# Patient Record
Sex: FEMALE | Race: WHITE | NOT HISPANIC OR LATINO
[De-identification: names, ages, dates, MRNs, and addresses within clinical notes are randomized per-mention and may not be internally consistent; named-entity substitution may affect disease eponyms.]

---

## 2017-01-31 ENCOUNTER — MEDICATION RENEWAL (OUTPATIENT)
Age: 67
End: 2017-01-31

## 2017-03-23 ENCOUNTER — APPOINTMENT (OUTPATIENT)
Dept: CARDIOLOGY | Facility: CLINIC | Age: 67
End: 2017-03-23

## 2017-03-28 ENCOUNTER — OUTPATIENT (OUTPATIENT)
Dept: OUTPATIENT SERVICES | Facility: HOSPITAL | Age: 67
LOS: 1 days | Discharge: HOME | End: 2017-03-28

## 2017-05-31 ENCOUNTER — APPOINTMENT (OUTPATIENT)
Dept: CARDIOLOGY | Facility: CLINIC | Age: 67
End: 2017-05-31

## 2017-05-31 VITALS — BODY MASS INDEX: 34.96 KG/M2 | WEIGHT: 190 LBS | HEIGHT: 62 IN

## 2017-05-31 VITALS — DIASTOLIC BLOOD PRESSURE: 80 MMHG | HEART RATE: 75 BPM | SYSTOLIC BLOOD PRESSURE: 130 MMHG

## 2017-06-27 DIAGNOSIS — Z12.31 ENCOUNTER FOR SCREENING MAMMOGRAM FOR MALIGNANT NEOPLASM OF BREAST: ICD-10-CM

## 2017-10-20 ENCOUNTER — APPOINTMENT (OUTPATIENT)
Dept: CARDIOLOGY | Facility: CLINIC | Age: 67
End: 2017-10-20

## 2017-10-20 VITALS — SYSTOLIC BLOOD PRESSURE: 120 MMHG | DIASTOLIC BLOOD PRESSURE: 80 MMHG | HEART RATE: 76 BPM

## 2017-10-20 VITALS — BODY MASS INDEX: 32.02 KG/M2 | WEIGHT: 174 LBS | HEIGHT: 62 IN

## 2018-01-12 ENCOUNTER — APPOINTMENT (OUTPATIENT)
Dept: CARDIOLOGY | Facility: CLINIC | Age: 68
End: 2018-01-12

## 2018-03-01 ENCOUNTER — APPOINTMENT (OUTPATIENT)
Dept: CARDIOLOGY | Facility: CLINIC | Age: 68
End: 2018-03-01

## 2018-03-01 VITALS — WEIGHT: 175 LBS | BODY MASS INDEX: 32.2 KG/M2 | HEIGHT: 62 IN

## 2018-03-01 VITALS — SYSTOLIC BLOOD PRESSURE: 130 MMHG | DIASTOLIC BLOOD PRESSURE: 80 MMHG | HEART RATE: 79 BPM

## 2018-03-01 VITALS — DIASTOLIC BLOOD PRESSURE: 82 MMHG | HEART RATE: 79 BPM | SYSTOLIC BLOOD PRESSURE: 140 MMHG

## 2018-04-03 ENCOUNTER — APPOINTMENT (OUTPATIENT)
Dept: VASCULAR SURGERY | Facility: CLINIC | Age: 68
End: 2018-04-03
Payer: MEDICARE

## 2018-04-03 VITALS
SYSTOLIC BLOOD PRESSURE: 150 MMHG | BODY MASS INDEX: 35.28 KG/M2 | WEIGHT: 175 LBS | DIASTOLIC BLOOD PRESSURE: 80 MMHG | HEIGHT: 59 IN

## 2018-04-03 PROCEDURE — 93880 EXTRACRANIAL BILAT STUDY: CPT

## 2018-04-03 PROCEDURE — 99203 OFFICE O/P NEW LOW 30 MIN: CPT

## 2018-04-10 ENCOUNTER — OUTPATIENT (OUTPATIENT)
Dept: OUTPATIENT SERVICES | Facility: HOSPITAL | Age: 68
LOS: 1 days | Discharge: HOME | End: 2018-04-10

## 2018-04-10 DIAGNOSIS — Z12.31 ENCOUNTER FOR SCREENING MAMMOGRAM FOR MALIGNANT NEOPLASM OF BREAST: ICD-10-CM

## 2018-07-19 ENCOUNTER — APPOINTMENT (OUTPATIENT)
Dept: CARDIOLOGY | Facility: CLINIC | Age: 68
End: 2018-07-19

## 2018-07-19 VITALS
HEIGHT: 59 IN | SYSTOLIC BLOOD PRESSURE: 146 MMHG | HEART RATE: 75 BPM | BODY MASS INDEX: 35.88 KG/M2 | WEIGHT: 178 LBS | DIASTOLIC BLOOD PRESSURE: 80 MMHG

## 2018-07-19 VITALS — DIASTOLIC BLOOD PRESSURE: 80 MMHG | SYSTOLIC BLOOD PRESSURE: 124 MMHG

## 2018-10-10 ENCOUNTER — RX RENEWAL (OUTPATIENT)
Age: 68
End: 2018-10-10

## 2018-12-20 ENCOUNTER — APPOINTMENT (OUTPATIENT)
Dept: CARDIOLOGY | Facility: CLINIC | Age: 68
End: 2018-12-20

## 2018-12-20 VITALS — SYSTOLIC BLOOD PRESSURE: 144 MMHG | DIASTOLIC BLOOD PRESSURE: 80 MMHG

## 2018-12-20 VITALS
HEART RATE: 69 BPM | SYSTOLIC BLOOD PRESSURE: 156 MMHG | WEIGHT: 183 LBS | HEIGHT: 59 IN | BODY MASS INDEX: 36.89 KG/M2 | DIASTOLIC BLOOD PRESSURE: 86 MMHG

## 2018-12-20 NOTE — PHYSICAL EXAM
[General Appearance - Well Developed] : well developed [Normal Appearance] : normal appearance [Well Groomed] : well groomed [General Appearance - Well Nourished] : well nourished [No Deformities] : no deformities [General Appearance - In No Acute Distress] : no acute distress [Normal Conjunctiva] : the conjunctiva exhibited no abnormalities [Eyelids - No Xanthelasma] : the eyelids demonstrated no xanthelasmas [Normal Oral Mucosa] : normal oral mucosa [No Oral Pallor] : no oral pallor [No Oral Cyanosis] : no oral cyanosis [Respiration, Rhythm And Depth] : normal respiratory rhythm and effort [Exaggerated Use Of Accessory Muscles For Inspiration] : no accessory muscle use [Auscultation Breath Sounds / Voice Sounds] : lungs were clear to auscultation bilaterally [Abdomen Soft] : soft [Abdomen Tenderness] : non-tender [Abdomen Mass (___ Cm)] : no abdominal mass palpated [Abnormal Walk] : normal gait [Gait - Sufficient For Exercise Testing] : the gait was sufficient for exercise testing [Nail Clubbing] : no clubbing of the fingernails [Cyanosis, Localized] : no localized cyanosis [Petechial Hemorrhages (___cm)] : no petechial hemorrhages [Skin Color & Pigmentation] : normal skin color and pigmentation [] : no rash [No Venous Stasis] : no venous stasis [Skin Lesions] : no skin lesions [No Skin Ulcers] : no skin ulcer [No Xanthoma] : no  xanthoma was observed [Oriented To Time, Place, And Person] : oriented to person, place, and time [Affect] : the affect was normal [Mood] : the mood was normal [No Anxiety] : not feeling anxious [Normal Rate] : normal [Rhythm Regular] : regular [II] : a grade 2 [2+] : left 2+ [No Pitting Edema] : no pitting edema present [FreeTextEntry1] : No JVD  [Rt] : no varicose veins of the right leg

## 2018-12-20 NOTE — ASSESSMENT
[FreeTextEntry1] : The patient is seeing neurology for possible dementia. Her LDL is not at goal. . There is a question if she is taking her Atorvastatin on a regular basis. She has Mild AS . and risk factors for CAD . She has right knee pain . Advised her to see orthopedics.

## 2018-12-20 NOTE — HISTORY OF PRESENT ILLNESS
[FreeTextEntry1] : The patient has had worsening dementia. The patient has been followed by Dr Carter. . She has had worsening right knee pain . .

## 2019-01-16 ENCOUNTER — APPOINTMENT (OUTPATIENT)
Dept: CARDIOLOGY | Facility: CLINIC | Age: 69
End: 2019-01-16

## 2019-01-16 ENCOUNTER — APPOINTMENT (OUTPATIENT)
Dept: VASCULAR SURGERY | Facility: CLINIC | Age: 69
End: 2019-01-16
Payer: MEDICARE

## 2019-01-16 PROCEDURE — 99213 OFFICE O/P EST LOW 20 MIN: CPT

## 2019-01-16 PROCEDURE — 93925 LOWER EXTREMITY STUDY: CPT

## 2019-01-16 NOTE — HISTORY OF PRESENT ILLNESS
[FreeTextEntry1] : 68 years old female presents to rule out "circulation" problem in the legs. She feels her feet are cold always and gets cramps in the leg at night. Able to walk long distance with no claudication pain. No ulcers. Has some venous stasis skin discoloration in both legs but no large varicose veins. NO history of DVT

## 2019-01-16 NOTE — ASSESSMENT
[FreeTextEntry1] : 68 years old female with normal arterial duplex and no evidence of PAD. She has venous insufficiency and stasis skin changes but no large varicose veins. I advised for leg elevation and stockings. I offered her venous reflux studies in the next visit, she will f/u as needed.

## 2019-01-16 NOTE — PHYSICAL EXAM
[JVD] : no jugular venous distention  [Normal Breath Sounds] : Normal breath sounds [2+] : left 2+ [1+] : left 1+ [Ankle Swelling (On Exam)] : present [Ankle Swelling Bilaterally] : bilaterally  [Varicose Veins Of Lower Extremities] : not present [] : bilaterally [Ankle Swelling On The Right] : mild [FreeTextEntry1] : mild venous stasis changes

## 2019-01-16 NOTE — REVIEW OF SYSTEMS
[Diarrhea] : diarrhea [Fever] : no fever [Chills] : no chills [Chest Pain] : no chest pain [Shortness Of Breath] : no shortness of breath [Abdominal Pain] : no abdominal pain [Vomiting] : no vomiting [Constipation] : no constipation [Dizziness] : no dizziness

## 2019-01-16 NOTE — REASON FOR VISIT
[Consultation] : a consultation visit [FreeTextEntry1] : Mrs Olson is here today for cold feet for the past year

## 2019-01-16 NOTE — DATA REVIEWED
[FreeTextEntry1] : Arterial duplex of b/l LE: patent arteries in both legs, no evidence of significant arterial disease.

## 2019-04-03 ENCOUNTER — APPOINTMENT (OUTPATIENT)
Dept: VASCULAR SURGERY | Facility: CLINIC | Age: 69
End: 2019-04-03
Payer: MEDICARE

## 2019-04-03 VITALS
DIASTOLIC BLOOD PRESSURE: 80 MMHG | SYSTOLIC BLOOD PRESSURE: 140 MMHG | OXYGEN SATURATION: 97 % | WEIGHT: 177 LBS | BODY MASS INDEX: 35.68 KG/M2 | HEIGHT: 59 IN | HEART RATE: 65 BPM

## 2019-04-03 PROCEDURE — 99212 OFFICE O/P EST SF 10 MIN: CPT

## 2019-04-03 NOTE — ASSESSMENT
[FreeTextEntry1] : 68 years old female with normal arterial duplex and no evidence of PAD. She has venous insufficiency and stasis skin changes but no large varicose veins. I advised for leg elevation and stockings. She will f/u for venous reflux studies in 4-6 weeks.

## 2019-04-03 NOTE — REASON FOR VISIT
[Follow-Up: _____] : a [unfilled] follow-up visit [FreeTextEntry1] : Mrs Olson is here today for 3 month follow on lower extremities

## 2019-04-03 NOTE — HISTORY OF PRESENT ILLNESS
[FreeTextEntry1] : 68 years old female presents for ruling out venous insufficiency. She feels her feet are cold always and gets cramps in the leg at night. Able to walk long distance with no claudication pain. No ulcers. Has some venous stasis skin discoloration in both legs but no large varicose veins. NO history of DVT. Has normal arterial duplex last visit.

## 2019-04-03 NOTE — REVIEW OF SYSTEMS
[Fever] : no fever [Chills] : no chills [Chest Pain] : no chest pain [Shortness Of Breath] : no shortness of breath [Abdominal Pain] : no abdominal pain [Vomiting] : no vomiting [Constipation] : no constipation [Diarrhea] : diarrhea [Dizziness] : no dizziness

## 2019-04-18 ENCOUNTER — OUTPATIENT (OUTPATIENT)
Dept: OUTPATIENT SERVICES | Facility: HOSPITAL | Age: 69
LOS: 1 days | Discharge: HOME | End: 2019-04-18
Payer: MEDICARE

## 2019-04-18 DIAGNOSIS — Z12.31 ENCOUNTER FOR SCREENING MAMMOGRAM FOR MALIGNANT NEOPLASM OF BREAST: ICD-10-CM

## 2019-04-18 PROCEDURE — 77063 BREAST TOMOSYNTHESIS BI: CPT | Mod: 26

## 2019-04-18 PROCEDURE — 77067 SCR MAMMO BI INCL CAD: CPT | Mod: 26

## 2019-04-19 DIAGNOSIS — Z78.0 ASYMPTOMATIC MENOPAUSAL STATE: ICD-10-CM

## 2019-04-19 DIAGNOSIS — Z87.310 PERSONAL HISTORY OF (HEALED) OSTEOPOROSIS FRACTURE: ICD-10-CM

## 2019-04-19 DIAGNOSIS — Z13.820 ENCOUNTER FOR SCREENING FOR OSTEOPOROSIS: ICD-10-CM

## 2019-04-19 DIAGNOSIS — M89.9 DISORDER OF BONE, UNSPECIFIED: ICD-10-CM

## 2019-05-20 ENCOUNTER — APPOINTMENT (OUTPATIENT)
Dept: CARDIOLOGY | Facility: CLINIC | Age: 69
End: 2019-05-20
Payer: MEDICARE

## 2019-05-20 VITALS
DIASTOLIC BLOOD PRESSURE: 84 MMHG | BODY MASS INDEX: 36.69 KG/M2 | HEIGHT: 59 IN | SYSTOLIC BLOOD PRESSURE: 160 MMHG | WEIGHT: 182 LBS | HEART RATE: 75 BPM

## 2019-05-20 VITALS — HEIGHT: 55 IN | BODY MASS INDEX: 42.35 KG/M2 | WEIGHT: 183 LBS

## 2019-05-20 PROCEDURE — 93000 ELECTROCARDIOGRAM COMPLETE: CPT

## 2019-05-20 PROCEDURE — 99214 OFFICE O/P EST MOD 30 MIN: CPT

## 2019-05-20 NOTE — HISTORY OF PRESENT ILLNESS
[FreeTextEntry1] : The patient had neuro-cognative testing with results pending. No chest pain . No SOB

## 2019-05-20 NOTE — ASSESSMENT
[FreeTextEntry1] : The patient has had worsening dementia. She is in the process of getting neuro cognitive testing. She has not had chest pain . BP is borderline to high BP . . She has had no chest pain or shortness of breth

## 2019-05-21 ENCOUNTER — OUTPATIENT (OUTPATIENT)
Dept: OUTPATIENT SERVICES | Facility: HOSPITAL | Age: 69
LOS: 1 days | Discharge: HOME | End: 2019-05-21

## 2019-05-21 DIAGNOSIS — G31.84 MILD COGNITIVE IMPAIRMENT OF UNCERTAIN OR UNKNOWN ETIOLOGY: ICD-10-CM

## 2019-06-05 ENCOUNTER — APPOINTMENT (OUTPATIENT)
Dept: VASCULAR SURGERY | Facility: CLINIC | Age: 69
End: 2019-06-05
Payer: MEDICARE

## 2019-06-05 VITALS — BODY MASS INDEX: 36.76 KG/M2 | DIASTOLIC BLOOD PRESSURE: 80 MMHG | SYSTOLIC BLOOD PRESSURE: 145 MMHG | WEIGHT: 182 LBS

## 2019-06-05 PROCEDURE — 99213 OFFICE O/P EST LOW 20 MIN: CPT

## 2019-06-05 PROCEDURE — 93970 EXTREMITY STUDY: CPT

## 2019-06-05 NOTE — HISTORY OF PRESENT ILLNESS
[FreeTextEntry1] : 68 years old female presents for ruling out venous insufficiency and to get venous reflux studies. She feels her feet are cold always. Able to walk long distance with no claudication pain. No ulcers. Has some venous stasis skin discoloration in both legs but no large varicose veins. NO history of DVT. Has normal arterial duplex last visit.

## 2019-06-05 NOTE — ASSESSMENT
[FreeTextEntry1] : 68 years old female with normal arterial duplex and no evidence of PAD. She has nmild stasis skin changes but no large varicose veins and her venous duplex today showed no DVT or reflux in both legs. I advised for leg elevation and stockings as needed. She will f/u on as needed basis.

## 2019-06-05 NOTE — REVIEW OF SYSTEMS
[Fever] : no fever [Chills] : no chills [Chest Pain] : no chest pain [Abdominal Pain] : no abdominal pain [Vomiting] : no vomiting [Shortness Of Breath] : no shortness of breath [Constipation] : no constipation [Diarrhea] : diarrhea [Dizziness] : no dizziness

## 2019-06-05 NOTE — REASON FOR VISIT
[FreeTextEntry1] : Erin is a 68 yr f for venous insufficiency  and stasis skin changes but no large varicose veins, She is here today for venous reflux studies

## 2019-06-05 NOTE — PHYSICAL EXAM
[JVD] : no jugular venous distention  [Normal Breath Sounds] : Normal breath sounds [2+] : right 2+ [1+] : right 1+ [Ankle Swelling (On Exam)] : present [Ankle Swelling Bilaterally] : bilaterally  [] : present [Varicose Veins Of Lower Extremities] : not present [Ankle Swelling On The Right] : mild [FreeTextEntry1] : mild venous stasis changes

## 2019-06-07 ENCOUNTER — RECORD ABSTRACTING (OUTPATIENT)
Age: 69
End: 2019-06-07

## 2019-07-03 ENCOUNTER — APPOINTMENT (OUTPATIENT)
Dept: CARDIOLOGY | Facility: CLINIC | Age: 69
End: 2019-07-03
Payer: MEDICARE

## 2019-07-03 VITALS
WEIGHT: 183 LBS | BODY MASS INDEX: 36.89 KG/M2 | HEART RATE: 80 BPM | SYSTOLIC BLOOD PRESSURE: 142 MMHG | HEIGHT: 59 IN | DIASTOLIC BLOOD PRESSURE: 88 MMHG

## 2019-07-03 PROCEDURE — 99214 OFFICE O/P EST MOD 30 MIN: CPT

## 2019-07-03 PROCEDURE — 93000 ELECTROCARDIOGRAM COMPLETE: CPT

## 2019-07-03 NOTE — HISTORY OF PRESENT ILLNESS
[FreeTextEntry1] : The patient has tolerated higher does Valsartan. thought to have some memory issues on her neurocognitive testing .

## 2019-07-03 NOTE — ASSESSMENT
[FreeTextEntry1] : The patient's BP is better controlled. No chest pain or shortness of breath . Had neurocognitive testing and she was told of memory issues but results are not available to me at this time.

## 2019-07-03 NOTE — REASON FOR VISIT
[Hypertension] : hypertension [Palpitations] : palpitations [Follow-Up - Clinic] : a clinic follow-up of

## 2019-07-03 NOTE — PHYSICAL EXAM
[General Appearance - Well Developed] : well developed [No Deformities] : no deformities [Well Groomed] : well groomed [Normal Appearance] : normal appearance [General Appearance - Well Nourished] : well nourished [General Appearance - In No Acute Distress] : no acute distress [Normal Conjunctiva] : the conjunctiva exhibited no abnormalities [Eyelids - No Xanthelasma] : the eyelids demonstrated no xanthelasmas [Normal Oral Mucosa] : normal oral mucosa [No Oral Pallor] : no oral pallor [FreeTextEntry1] : No JVD  [No Oral Cyanosis] : no oral cyanosis [Exaggerated Use Of Accessory Muscles For Inspiration] : no accessory muscle use [Respiration, Rhythm And Depth] : normal respiratory rhythm and effort [Abdomen Tenderness] : non-tender [Auscultation Breath Sounds / Voice Sounds] : lungs were clear to auscultation bilaterally [Abdomen Soft] : soft [Abdomen Mass (___ Cm)] : no abdominal mass palpated [Abnormal Walk] : normal gait [Gait - Sufficient For Exercise Testing] : the gait was sufficient for exercise testing [Nail Clubbing] : no clubbing of the fingernails [Petechial Hemorrhages (___cm)] : no petechial hemorrhages [Cyanosis, Localized] : no localized cyanosis [] : no ischemic changes [Skin Color & Pigmentation] : normal skin color and pigmentation [No Skin Ulcers] : no skin ulcer [Skin Lesions] : no skin lesions [No Venous Stasis] : no venous stasis [No Xanthoma] : no  xanthoma was observed [Mood] : the mood was normal [Oriented To Time, Place, And Person] : oriented to person, place, and time [Affect] : the affect was normal [No Anxiety] : not feeling anxious [Normal Rate] : normal [II] : a grade 2 [Rhythm Regular] : regular [2+] : right 2+ [Rt] : no varicose veins of the right leg [No Pitting Edema] : no pitting edema present

## 2019-07-08 ENCOUNTER — APPOINTMENT (OUTPATIENT)
Dept: NEUROLOGY | Facility: CLINIC | Age: 69
End: 2019-07-08
Payer: MEDICARE

## 2019-07-08 VITALS
SYSTOLIC BLOOD PRESSURE: 130 MMHG | DIASTOLIC BLOOD PRESSURE: 70 MMHG | BODY MASS INDEX: 33.57 KG/M2 | HEIGHT: 62 IN | TEMPERATURE: 98.3 F | OXYGEN SATURATION: 99 % | HEART RATE: 73 BPM | WEIGHT: 182.4 LBS

## 2019-07-08 PROCEDURE — 99214 OFFICE O/P EST MOD 30 MIN: CPT

## 2019-07-08 NOTE — HISTORY OF PRESENT ILLNESS
[FreeTextEntry1] : barry is here for followup. She had done her neuropsychology test done the diagnosis was mild neurocognitive impairment, generalized anxiety disorder, recurrent major depression.\par I have seen her first floor having issues with her memory. Throughout the period that I saw her perhaps in 3 or 4 occasions it has always been my impression that her main issue with the memory was related to her mood and perhaps asleep in fact after her first she did much better and in last visit aside from sending care for her neuropsychology test I had started her on Celexa but she did not start yet.\par Clearly there is some issues related to her  and his diagnosis of posttraumatic stress disorder in that created significant degree of anxiety for her. She says her  goes to bed around 6:30 PM and that is the time when she relaxes and in fact you in that time she watches TV and she eats some junk and comfort food.\par She appeared quite emotional during couple visits in the past now she is better.\par She believes her memory issues had been started from that time that she did have her knee surgery. At that time this she does have a obstructive sleep apnea and gastroesophageal reflux and dyslipidemia and hypothyroidism and hypertension and borderline diabetes.

## 2019-07-08 NOTE — PHYSICAL EXAM
[FreeTextEntry1] : She is in a better mood she makes a meaningful interaction with normal language follows first to commands and crosses the midline well at executive behavior is normal. Her memory recall is also 3 out of 3.\par The motor and sensory and cerebellar exam are normal she is a slightly hesitant putting weight on the right side her gait is stable

## 2019-07-08 NOTE — ASSESSMENT
[FreeTextEntry1] : Place year and a history as described above. I I believe majority of her issues related to the memory comes from her mood and anxiety and to some extent in her sleep pattern we will restart the Celexa she agrees to take the medication and I will see her in 3 months he had also discussed extensively today is to take care of her anxiety abnormal.

## 2019-10-21 ENCOUNTER — APPOINTMENT (OUTPATIENT)
Dept: NEUROLOGY | Facility: CLINIC | Age: 69
End: 2019-10-21
Payer: MEDICARE

## 2019-10-21 VITALS
OXYGEN SATURATION: 99 % | HEART RATE: 79 BPM | TEMPERATURE: 97.3 F | BODY MASS INDEX: 33.49 KG/M2 | WEIGHT: 182 LBS | HEIGHT: 62 IN | DIASTOLIC BLOOD PRESSURE: 70 MMHG | SYSTOLIC BLOOD PRESSURE: 132 MMHG

## 2019-10-21 DIAGNOSIS — F39 UNSPECIFIED MOOD [AFFECTIVE] DISORDER: ICD-10-CM

## 2019-10-21 PROCEDURE — 99214 OFFICE O/P EST MOD 30 MIN: CPT

## 2019-10-21 RX ORDER — VALSARTAN 320 MG/1
320 TABLET, COATED ORAL DAILY
Qty: 90 | Refills: 3 | Status: DISCONTINUED | COMMUNITY
End: 2019-10-21

## 2019-10-21 NOTE — HISTORY OF PRESENT ILLNESS
[FreeTextEntry1] : Coleen is here for followup.. 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 We have asked her to do and neuropsychology test that diagnosed her with mild neural content to keep impairment and generalized anxiety disorder and current major depression .\par She has been doing significantly better this last visit. In that visit she agreed to go on Celexa however after couple of weeks she called and reported having side effects and from there he had to stop the Celexa. Now she says she is doing better by the fact that knowing that she does not have degenerative brain disease to the best that he can say she feels more comfortable. She is doing everything at her baseline without any issues at significant change I believe started after her  also then to psychiatry. He does have history of PT SD.\par She is also doing something that I believe was helpful and that is using her CPAP machine under regular bases and sleeping much better.\par She says she is not emotionally anymore. She does carry a cane today by the fact that her right knee is bothering her. She says she does not want to do surgery on the right knee as today what happened to her after the left knee surgery.

## 2019-10-21 NOTE — PHYSICAL EXAM
[FreeTextEntry1] : She is awake alert oriented x3 follows commands crosses the midline she is in a good mood. She is not looking tired.\par Attention and concentration is normal. Her executive behavior and memory recall are normal. She is aware of the current events.\par Cranial nerve exam normal\par Motor exam normal she has a tendency to drop her left shoulder down and to have a slight scoliotic posture to the right.\par Her gait is a stable\par Romberg is negative\par Carotid exam shows no bruit\par The heart rate is regular and sinus

## 2019-10-21 NOTE — ASSESSMENT
[FreeTextEntry1] : Tim is here for followup with the diagnoses of anxiety disorder and some issues with her memory and also sleep apnea. She has been doing significantly better considering her level of comfort about her diagnoses at this point that isn't having difficulty with her memory because of her mood and his sleep issues issues\par He will continue observing her and give her a followup for 6 months on a p.r.n. basis. If she does have an issues she is will be calling me.

## 2019-11-19 ENCOUNTER — APPOINTMENT (OUTPATIENT)
Dept: CARDIOLOGY | Facility: CLINIC | Age: 69
End: 2019-11-19
Payer: MEDICARE

## 2019-11-19 VITALS
SYSTOLIC BLOOD PRESSURE: 130 MMHG | HEART RATE: 69 BPM | DIASTOLIC BLOOD PRESSURE: 90 MMHG | HEIGHT: 62 IN | WEIGHT: 187 LBS | BODY MASS INDEX: 34.41 KG/M2

## 2019-11-19 PROCEDURE — 93000 ELECTROCARDIOGRAM COMPLETE: CPT

## 2019-11-19 PROCEDURE — 99214 OFFICE O/P EST MOD 30 MIN: CPT

## 2019-11-19 NOTE — REASON FOR VISIT
[Follow-Up - Clinic] : a clinic follow-up of [Palpitations] : palpitations [Hypertension] : hypertension

## 2019-11-19 NOTE — PHYSICAL EXAM
[General Appearance - Well Developed] : well developed [Normal Appearance] : normal appearance [Well Groomed] : well groomed [No Deformities] : no deformities [General Appearance - Well Nourished] : well nourished [General Appearance - In No Acute Distress] : no acute distress [Normal Conjunctiva] : the conjunctiva exhibited no abnormalities [Eyelids - No Xanthelasma] : the eyelids demonstrated no xanthelasmas [Normal Oral Mucosa] : normal oral mucosa [No Oral Cyanosis] : no oral cyanosis [No Oral Pallor] : no oral pallor [Respiration, Rhythm And Depth] : normal respiratory rhythm and effort [Exaggerated Use Of Accessory Muscles For Inspiration] : no accessory muscle use [Abdomen Soft] : soft [Auscultation Breath Sounds / Voice Sounds] : lungs were clear to auscultation bilaterally [Abdomen Tenderness] : non-tender [Abdomen Mass (___ Cm)] : no abdominal mass palpated [Abnormal Walk] : normal gait [Gait - Sufficient For Exercise Testing] : the gait was sufficient for exercise testing [Nail Clubbing] : no clubbing of the fingernails [Petechial Hemorrhages (___cm)] : no petechial hemorrhages [Cyanosis, Localized] : no localized cyanosis [Skin Color & Pigmentation] : normal skin color and pigmentation [] : no rash [No Venous Stasis] : no venous stasis [Skin Lesions] : no skin lesions [No Skin Ulcers] : no skin ulcer [No Xanthoma] : no  xanthoma was observed [Affect] : the affect was normal [Oriented To Time, Place, And Person] : oriented to person, place, and time [Mood] : the mood was normal [Normal Rate] : normal [No Anxiety] : not feeling anxious [II] : a grade 2 [Rhythm Regular] : regular [2+] : left 2+ [No Pitting Edema] : no pitting edema present [FreeTextEntry1] : No JVD  [Rt] : no varicose veins of the right leg

## 2019-11-19 NOTE — ASSESSMENT
[FreeTextEntry1] : The patient's BP is better controlled. No chest pain or shortness of breath . Has had palpitations .

## 2019-11-19 NOTE — HISTORY OF PRESENT ILLNESS
[FreeTextEntry1] : The patient has been feeling well. No chest pain . some sinus issues . Followed by Dr. collado . Back on CPAP Occasional palpitaitons .

## 2019-11-21 ENCOUNTER — OTHER (OUTPATIENT)
Age: 69
End: 2019-11-21

## 2019-12-08 ENCOUNTER — FORM ENCOUNTER (OUTPATIENT)
Age: 69
End: 2019-12-08

## 2019-12-09 ENCOUNTER — OUTPATIENT (OUTPATIENT)
Dept: OUTPATIENT SERVICES | Facility: HOSPITAL | Age: 69
LOS: 1 days | Discharge: HOME | End: 2019-12-09
Payer: MEDICARE

## 2019-12-09 DIAGNOSIS — E66.9 OBESITY, UNSPECIFIED: ICD-10-CM

## 2019-12-09 DIAGNOSIS — R94.31 ABNORMAL ELECTROCARDIOGRAM [ECG] [EKG]: ICD-10-CM

## 2019-12-09 DIAGNOSIS — G47.33 OBSTRUCTIVE SLEEP APNEA (ADULT) (PEDIATRIC): ICD-10-CM

## 2019-12-09 PROCEDURE — 78452 HT MUSCLE IMAGE SPECT MULT: CPT | Mod: 26

## 2019-12-16 ENCOUNTER — OUTPATIENT (OUTPATIENT)
Dept: OUTPATIENT SERVICES | Facility: HOSPITAL | Age: 69
LOS: 1 days | Discharge: HOME | End: 2019-12-16
Payer: MEDICARE

## 2019-12-16 DIAGNOSIS — N64.4 MASTODYNIA: ICD-10-CM

## 2019-12-16 PROCEDURE — G0279: CPT | Mod: 26,LT

## 2019-12-16 PROCEDURE — 77065 DX MAMMO INCL CAD UNI: CPT | Mod: 26,LT

## 2019-12-16 PROCEDURE — 76642 ULTRASOUND BREAST LIMITED: CPT | Mod: 26,LT

## 2020-03-20 ENCOUNTER — APPOINTMENT (OUTPATIENT)
Dept: CARDIOLOGY | Facility: CLINIC | Age: 70
End: 2020-03-20

## 2020-03-30 ENCOUNTER — APPOINTMENT (OUTPATIENT)
Dept: CARDIOLOGY | Facility: CLINIC | Age: 70
End: 2020-03-30

## 2020-04-01 ENCOUNTER — APPOINTMENT (OUTPATIENT)
Dept: CARDIOLOGY | Facility: CLINIC | Age: 70
End: 2020-04-01

## 2020-04-20 ENCOUNTER — APPOINTMENT (OUTPATIENT)
Dept: NEUROLOGY | Facility: CLINIC | Age: 70
End: 2020-04-20

## 2020-04-24 ENCOUNTER — APPOINTMENT (OUTPATIENT)
Dept: CARDIOLOGY | Facility: CLINIC | Age: 70
End: 2020-04-24
Payer: MEDICARE

## 2020-04-24 PROCEDURE — 99442: CPT | Mod: CR

## 2020-04-24 NOTE — HISTORY OF PRESENT ILLNESS
[Verbal consent obtained from patient] : the patient, [unfilled] [Time Spent: ___ minutes] : I have spent [unfilled] minutes with the patient on the telephone [FreeTextEntry1] : The patient has been stressed about Covid 19 . She has been fine . No chest pain No SOB . Her BP has not been taking  her BP . Her weight was 190 .

## 2020-08-10 ENCOUNTER — APPOINTMENT (OUTPATIENT)
Dept: CARDIOLOGY | Facility: CLINIC | Age: 70
End: 2020-08-10
Payer: MEDICARE

## 2020-08-10 VITALS
WEIGHT: 189 LBS | DIASTOLIC BLOOD PRESSURE: 88 MMHG | TEMPERATURE: 96.2 F | BODY MASS INDEX: 34.78 KG/M2 | SYSTOLIC BLOOD PRESSURE: 138 MMHG | HEIGHT: 62 IN | HEART RATE: 84 BPM

## 2020-08-10 PROCEDURE — 99214 OFFICE O/P EST MOD 30 MIN: CPT

## 2020-08-10 PROCEDURE — 93000 ELECTROCARDIOGRAM COMPLETE: CPT

## 2020-08-10 NOTE — PHYSICAL EXAM
[General Appearance - Well Developed] : well developed [Normal Appearance] : normal appearance [Well Groomed] : well groomed [No Deformities] : no deformities [General Appearance - In No Acute Distress] : no acute distress [General Appearance - Well Nourished] : well nourished [Normal Conjunctiva] : the conjunctiva exhibited no abnormalities [Eyelids - No Xanthelasma] : the eyelids demonstrated no xanthelasmas [No Oral Cyanosis] : no oral cyanosis [Normal Oral Mucosa] : normal oral mucosa [No Oral Pallor] : no oral pallor [Respiration, Rhythm And Depth] : normal respiratory rhythm and effort [FreeTextEntry1] : No JVD  [Auscultation Breath Sounds / Voice Sounds] : lungs were clear to auscultation bilaterally [Exaggerated Use Of Accessory Muscles For Inspiration] : no accessory muscle use [Abdomen Soft] : soft [Abdomen Tenderness] : non-tender [Abdomen Mass (___ Cm)] : no abdominal mass palpated [Gait - Sufficient For Exercise Testing] : the gait was sufficient for exercise testing [Abnormal Walk] : normal gait [Petechial Hemorrhages (___cm)] : no petechial hemorrhages [Cyanosis, Localized] : no localized cyanosis [Nail Clubbing] : no clubbing of the fingernails [Skin Color & Pigmentation] : normal skin color and pigmentation [No Venous Stasis] : no venous stasis [Skin Lesions] : no skin lesions [] : no rash [Oriented To Time, Place, And Person] : oriented to person, place, and time [No Skin Ulcers] : no skin ulcer [No Xanthoma] : no  xanthoma was observed [Affect] : the affect was normal [Mood] : the mood was normal [No Anxiety] : not feeling anxious [Normal Rate] : normal [Rhythm Regular] : regular [II] : a grade 2 [2+] : right 2+ [No Pitting Edema] : no pitting edema present [Rt] : no varicose veins of the right leg

## 2020-08-10 NOTE — HISTORY OF PRESENT ILLNESS
[FreeTextEntry1] : The patient has been anxious. She has not been following diet and her blood sugar and LDL is not at goal. No chest pain or SOB .

## 2020-08-10 NOTE — PHYSICAL EXAM
[General Appearance - Well Developed] : well developed [Well Groomed] : well groomed [Normal Appearance] : normal appearance [General Appearance - In No Acute Distress] : no acute distress [No Deformities] : no deformities [General Appearance - Well Nourished] : well nourished [Normal Conjunctiva] : the conjunctiva exhibited no abnormalities [Eyelids - No Xanthelasma] : the eyelids demonstrated no xanthelasmas [No Oral Pallor] : no oral pallor [No Oral Cyanosis] : no oral cyanosis [Normal Oral Mucosa] : normal oral mucosa [Respiration, Rhythm And Depth] : normal respiratory rhythm and effort [FreeTextEntry1] : No JVD  [Abdomen Soft] : soft [Exaggerated Use Of Accessory Muscles For Inspiration] : no accessory muscle use [Auscultation Breath Sounds / Voice Sounds] : lungs were clear to auscultation bilaterally [Abdomen Tenderness] : non-tender [Abdomen Mass (___ Cm)] : no abdominal mass palpated [Abnormal Walk] : normal gait [Gait - Sufficient For Exercise Testing] : the gait was sufficient for exercise testing [Cyanosis, Localized] : no localized cyanosis [Nail Clubbing] : no clubbing of the fingernails [Petechial Hemorrhages (___cm)] : no petechial hemorrhages [Skin Color & Pigmentation] : normal skin color and pigmentation [Skin Lesions] : no skin lesions [] : no rash [No Venous Stasis] : no venous stasis [Oriented To Time, Place, And Person] : oriented to person, place, and time [No Xanthoma] : no  xanthoma was observed [No Skin Ulcers] : no skin ulcer [Affect] : the affect was normal [No Anxiety] : not feeling anxious [Mood] : the mood was normal [Normal Rate] : normal [Rhythm Regular] : regular [II] : a grade 2 [2+] : left 2+ [No Pitting Edema] : no pitting edema present [Rt] : no varicose veins of the right leg

## 2020-08-10 NOTE — ASSESSMENT
[FreeTextEntry1] : The patient has been under stress with Covid 19 The patient has had no chest pain or SOB  but has been bad with her diet . The patient ws told to speak with her PCP about her increased anxiety . LDL is not at goal. Nuclear stress test from 12-19 was negative for ischemia .

## 2020-09-08 ENCOUNTER — APPOINTMENT (OUTPATIENT)
Dept: NEUROLOGY | Facility: CLINIC | Age: 70
End: 2020-09-08

## 2020-12-02 ENCOUNTER — APPOINTMENT (OUTPATIENT)
Dept: CARDIOLOGY | Facility: CLINIC | Age: 70
End: 2020-12-02

## 2021-01-04 ENCOUNTER — APPOINTMENT (OUTPATIENT)
Dept: CARDIOLOGY | Facility: CLINIC | Age: 71
End: 2021-01-04
Payer: MEDICARE

## 2021-01-04 VITALS — HEART RATE: 92 BPM | WEIGHT: 179 LBS | TEMPERATURE: 97.2 F | HEIGHT: 62 IN | BODY MASS INDEX: 32.94 KG/M2

## 2021-01-04 VITALS — DIASTOLIC BLOOD PRESSURE: 80 MMHG | SYSTOLIC BLOOD PRESSURE: 142 MMHG

## 2021-01-04 DIAGNOSIS — E07.9 DISORDER OF THYROID, UNSPECIFIED: ICD-10-CM

## 2021-01-04 PROCEDURE — 99214 OFFICE O/P EST MOD 30 MIN: CPT

## 2021-01-04 PROCEDURE — 93000 ELECTROCARDIOGRAM COMPLETE: CPT

## 2021-01-04 NOTE — ASSESSMENT
[FreeTextEntry1] : The patient has lost weight . The patient has significant risk factors for CAD . Previous ischemia work up was negative for ischemia . The patient has had no chest pain . She has known mild AS and has no symptoms related to that . She has lost weight  and there is improvement in her LDL cholesterol although still not at goal

## 2021-01-04 NOTE — HISTORY OF PRESENT ILLNESS
[FreeTextEntry1] : The patient has been feeling well overall. She is seeing PT  for right arm pain . No chest pain or SOB . LDL is not quite at goal.

## 2021-01-29 ENCOUNTER — APPOINTMENT (OUTPATIENT)
Dept: CARDIOLOGY | Facility: CLINIC | Age: 71
End: 2021-01-29

## 2021-05-17 ENCOUNTER — APPOINTMENT (OUTPATIENT)
Dept: CARDIOLOGY | Facility: CLINIC | Age: 71
End: 2021-05-17
Payer: MEDICARE

## 2021-05-17 PROCEDURE — 93306 TTE W/DOPPLER COMPLETE: CPT

## 2021-06-07 ENCOUNTER — APPOINTMENT (OUTPATIENT)
Dept: CARDIOLOGY | Facility: CLINIC | Age: 71
End: 2021-06-07
Payer: MEDICARE

## 2021-06-07 VITALS
HEIGHT: 62 IN | WEIGHT: 177 LBS | SYSTOLIC BLOOD PRESSURE: 140 MMHG | DIASTOLIC BLOOD PRESSURE: 78 MMHG | TEMPERATURE: 97.1 F | BODY MASS INDEX: 32.57 KG/M2

## 2021-06-07 DIAGNOSIS — R20.9 UNSPECIFIED DISTURBANCES OF SKIN SENSATION: ICD-10-CM

## 2021-06-07 PROCEDURE — 93000 ELECTROCARDIOGRAM COMPLETE: CPT

## 2021-06-07 PROCEDURE — 99214 OFFICE O/P EST MOD 30 MIN: CPT

## 2021-06-07 NOTE — HISTORY OF PRESENT ILLNESS
[FreeTextEntry1] : The patient has not had chest pain or SOB . The patient has increased potassium .

## 2021-06-07 NOTE — ASSESSMENT
[FreeTextEntry1] : The patient has lost weight . The patient has significant risk factors for CAD . Previous ischemia work up was negative for ischemia . The patient has had no chest pain . She has known mild AS and has no symptoms related to that . She has lost weight  and there is improvement in her LDL cholesterol is at goal. The patient has potassium which is high . Will give her a low potassium diet .

## 2021-06-07 NOTE — REASON FOR VISIT
[CV Risk Factors and Non-Cardiac Disease] : CV risk factors and non-cardiac disease [Hyperlipidemia] : hyperlipidemia [Carotid, Aortic and Peripheral Vascular Disease] : carotid, aortic and peripheral vascular disease [Follow-Up - Clinic] : a clinic follow-up of [Hypertension] : hypertension [Palpitations] : palpitations [FreeTextEntry3] : Martinez/Riky

## 2021-06-07 NOTE — CARDIOLOGY SUMMARY
[___] : [unfilled] [de-identified] : 6-7-2021 NSR poor R wav progression V1-V3  [de-identified] : 5- Normal LV systolic functio mild MR mild TR Mild AS

## 2021-08-24 ENCOUNTER — APPOINTMENT (OUTPATIENT)
Dept: PULMONOLOGY | Facility: CLINIC | Age: 71
End: 2021-08-24
Payer: MEDICARE

## 2021-08-24 VITALS
BODY MASS INDEX: 34.75 KG/M2 | WEIGHT: 177 LBS | RESPIRATION RATE: 14 BRPM | DIASTOLIC BLOOD PRESSURE: 76 MMHG | HEART RATE: 70 BPM | SYSTOLIC BLOOD PRESSURE: 138 MMHG | OXYGEN SATURATION: 98 % | HEIGHT: 60 IN

## 2021-08-24 DIAGNOSIS — E66.9 OBESITY, UNSPECIFIED: ICD-10-CM

## 2021-08-24 PROCEDURE — 99214 OFFICE O/P EST MOD 30 MIN: CPT

## 2021-08-24 NOTE — ASSESSMENT
[FreeTextEntry1] : A:\par YOHAN\par Obesity\par \par PLAN:\par The patient is benefitting from the PAP device .\par New supplies ordered \par Weight loss discussed\par I stressed the need maintain compliance  with the PAP device.\par The patient is not to use an Ozone or UV sterilizer. \par F/U in 6 months\par \par THe patient  PAP device is not under recall. \par The patient is not to use an Ozone or UV sterilizer on the unit.\par  Needs a new CPAP unit\par will order

## 2021-08-24 NOTE — HISTORY OF PRESENT ILLNESS
[Excess Weight] : excess weight [Weight Increase] : weight increase [Inability to Lose Weight] : inability to lose weight [Knee Pain] : knee pain [Follow-Up - Routine Clinic] : a routine clinic follow-up of [Unrefreshing Sleep] : unrefreshing sleep [Currently Experiencing] : The patient is currently experiencing symptoms. [CPAP] : CPAP [Good Compliance] : good compliance with treatment [Good Tolerance] : good tolerance of treatment [Good Symptom Control] : good symptom control [de-identified] : Unit > 5 years old needs new unit [TextBox_4] : I reviewed my original evaluation  and last notes.\par \par I reviewed the cardiology consultants notes.\par \par

## 2021-09-17 ENCOUNTER — OUTPATIENT (OUTPATIENT)
Dept: OUTPATIENT SERVICES | Facility: HOSPITAL | Age: 71
LOS: 1 days | Discharge: HOME | End: 2021-09-17
Payer: MEDICARE

## 2021-09-17 DIAGNOSIS — Z12.31 ENCOUNTER FOR SCREENING MAMMOGRAM FOR MALIGNANT NEOPLASM OF BREAST: ICD-10-CM

## 2021-09-17 PROCEDURE — 77067 SCR MAMMO BI INCL CAD: CPT | Mod: 26

## 2021-09-17 PROCEDURE — 77063 BREAST TOMOSYNTHESIS BI: CPT | Mod: 26

## 2021-11-02 ENCOUNTER — APPOINTMENT (OUTPATIENT)
Dept: CARDIOLOGY | Facility: CLINIC | Age: 71
End: 2021-11-02
Payer: MEDICARE

## 2021-11-02 VITALS
WEIGHT: 172 LBS | HEART RATE: 63 BPM | DIASTOLIC BLOOD PRESSURE: 76 MMHG | HEIGHT: 60 IN | TEMPERATURE: 97.3 F | SYSTOLIC BLOOD PRESSURE: 126 MMHG | BODY MASS INDEX: 33.77 KG/M2

## 2021-11-02 DIAGNOSIS — F51.9 SLEEP DISORDER NOT DUE TO A SUBSTANCE OR KNOWN PHYSIOLOGICAL CONDITION, UNSPECIFIED: ICD-10-CM

## 2021-11-02 DIAGNOSIS — K21.00 GASTRO-ESOPHAGEAL REFLUX DISEASE WITH ESOPHAGITIS, WITHOUT BLEEDING: ICD-10-CM

## 2021-11-02 DIAGNOSIS — G47.33 OBSTRUCTIVE SLEEP APNEA (ADULT) (PEDIATRIC): ICD-10-CM

## 2021-11-02 PROCEDURE — 99214 OFFICE O/P EST MOD 30 MIN: CPT

## 2021-11-02 PROCEDURE — 93000 ELECTROCARDIOGRAM COMPLETE: CPT

## 2021-11-02 NOTE — CARDIOLOGY SUMMARY
[___] : [unfilled] [de-identified] : 6-7-2021 NSR poor R wav progression V1-V3 \par 11-2-2021 NSR Normal ECG  . [de-identified] : 5- Normal LV systolic functio mild MR mild TR Mild AS

## 2021-11-02 NOTE — HISTORY OF PRESENT ILLNESS
[FreeTextEntry1] : The patient has not had chest pain or SOB . The patient has increased potassium . Overall she feels well. FBS is high but A1C is ok. Getting vestibular therapy for vertigo

## 2021-11-02 NOTE — ASSESSMENT
[FreeTextEntry1] : The patient has lost weight . The patient has significant risk factors for CAD . Previous ischemia work up was negative for ischemia last nuclear stress test was less than 2 years ago . The patient has had no chest pain . She has known mild AS and has no symptoms related to that . She has lost weight and has lost more weight since last visit . Her diet has changed in particular night snacks   and there is improvement in her LDL cholesterol is at goal. Potassium is normal.

## 2021-11-30 ENCOUNTER — OUTPATIENT (OUTPATIENT)
Dept: OUTPATIENT SERVICES | Facility: HOSPITAL | Age: 71
LOS: 1 days | Discharge: HOME | End: 2021-11-30

## 2021-12-01 DIAGNOSIS — Z13.820 ENCOUNTER FOR SCREENING FOR OSTEOPOROSIS: ICD-10-CM

## 2021-12-01 DIAGNOSIS — M89.9 DISORDER OF BONE, UNSPECIFIED: ICD-10-CM

## 2021-12-01 DIAGNOSIS — Z87.310 PERSONAL HISTORY OF (HEALED) OSTEOPOROSIS FRACTURE: ICD-10-CM

## 2021-12-01 DIAGNOSIS — Z78.0 ASYMPTOMATIC MENOPAUSAL STATE: ICD-10-CM

## 2022-05-19 ENCOUNTER — APPOINTMENT (OUTPATIENT)
Dept: CARDIOLOGY | Facility: CLINIC | Age: 72
End: 2022-05-19
Payer: MEDICARE

## 2022-05-19 VITALS
HEART RATE: 89 BPM | SYSTOLIC BLOOD PRESSURE: 160 MMHG | BODY MASS INDEX: 34.37 KG/M2 | DIASTOLIC BLOOD PRESSURE: 90 MMHG | WEIGHT: 176 LBS

## 2022-05-19 PROCEDURE — 99214 OFFICE O/P EST MOD 30 MIN: CPT

## 2022-05-19 PROCEDURE — 93000 ELECTROCARDIOGRAM COMPLETE: CPT

## 2022-05-19 NOTE — CARDIOLOGY SUMMARY
[___] : [unfilled] [de-identified] : 6-7-2021 NSR poor R wav progression V1-V3 \par 11-2-2021 NSR Normal ECG  .\par 5- NSR Normal ECG .  [de-identified] : 5- Normal LV systolic functio mild MR mild TR Mild AS

## 2022-05-19 NOTE — PHYSICAL EXAM
[General Appearance - Well Developed] : well developed [Normal Appearance] : normal appearance [Well Groomed] : well groomed [General Appearance - Well Nourished] : well nourished [No Deformities] : no deformities [General Appearance - In No Acute Distress] : no acute distress [Normal Conjunctiva] : the conjunctiva exhibited no abnormalities [Eyelids - No Xanthelasma] : the eyelids demonstrated no xanthelasmas [Normal Oral Mucosa] : normal oral mucosa [No Oral Pallor] : no oral pallor [No Oral Cyanosis] : no oral cyanosis [Respiration, Rhythm And Depth] : normal respiratory rhythm and effort [Exaggerated Use Of Accessory Muscles For Inspiration] : no accessory muscle use [Auscultation Breath Sounds / Voice Sounds] : lungs were clear to auscultation bilaterally [Abdomen Soft] : soft [Abdomen Tenderness] : non-tender [Abdomen Mass (___ Cm)] : no abdominal mass palpated [Abnormal Walk] : normal gait [Gait - Sufficient For Exercise Testing] : the gait was sufficient for exercise testing [Nail Clubbing] : no clubbing of the fingernails [Cyanosis, Localized] : no localized cyanosis [Petechial Hemorrhages (___cm)] : no petechial hemorrhages [Skin Color & Pigmentation] : normal skin color and pigmentation [] : no rash [No Venous Stasis] : no venous stasis [No Skin Ulcers] : no skin ulcer [Skin Lesions] : no skin lesions [No Xanthoma] : no  xanthoma was observed [Oriented To Time, Place, And Person] : oriented to person, place, and time [Affect] : the affect was normal [Mood] : the mood was normal [No Anxiety] : not feeling anxious [Normal Rate] : normal [Rhythm Regular] : regular [II] : a grade 2 [2+] : left 2+ [No Pitting Edema] : no pitting edema present [FreeTextEntry1] : No JVD  [Rt] : no varicose veins of the right leg

## 2022-05-19 NOTE — ASSESSMENT
[FreeTextEntry1] : . The patient has significant risk factors for CAD . Previous ischemia work up was negative for ischemia last nuclear stress test  . The patient has had no chest pain . She has known mild AS and has no symptoms related to that . LDL is acceptable . A1C is 6.4 .

## 2022-06-16 ENCOUNTER — APPOINTMENT (OUTPATIENT)
Dept: CARDIOLOGY | Facility: CLINIC | Age: 72
End: 2022-06-16

## 2022-07-06 ENCOUNTER — APPOINTMENT (OUTPATIENT)
Dept: CARDIOLOGY | Facility: CLINIC | Age: 72
End: 2022-07-06

## 2022-07-06 PROCEDURE — 93880 EXTRACRANIAL BILAT STUDY: CPT

## 2022-09-22 ENCOUNTER — OUTPATIENT (OUTPATIENT)
Dept: OUTPATIENT SERVICES | Facility: HOSPITAL | Age: 72
LOS: 1 days | Discharge: HOME | End: 2022-09-22

## 2022-09-22 DIAGNOSIS — Z12.31 ENCOUNTER FOR SCREENING MAMMOGRAM FOR MALIGNANT NEOPLASM OF BREAST: ICD-10-CM

## 2022-09-22 PROCEDURE — 77063 BREAST TOMOSYNTHESIS BI: CPT | Mod: 26

## 2022-09-22 PROCEDURE — 77067 SCR MAMMO BI INCL CAD: CPT | Mod: 26

## 2022-10-09 LAB
ALBUMIN SERPL ELPH-MCNC: 4.8 G/DL
ALP BLD-CCNC: 126 U/L
ALT SERPL-CCNC: 15 U/L
ANION GAP SERPL CALC-SCNC: 12 MMOL/L
AST SERPL-CCNC: 16 U/L
BASOPHILS # BLD AUTO: 0.06 K/UL
BASOPHILS NFR BLD AUTO: 0.7 %
BILIRUB SERPL-MCNC: 0.3 MG/DL
BUN SERPL-MCNC: 18 MG/DL
CALCIUM SERPL-MCNC: 10 MG/DL
CHLORIDE SERPL-SCNC: 103 MMOL/L
CHOLEST SERPL-MCNC: 165 MG/DL
CO2 SERPL-SCNC: 27 MMOL/L
CREAT SERPL-MCNC: 1 MG/DL
EGFR: 59 ML/MIN/1.73M2
EOSINOPHIL # BLD AUTO: 0.19 K/UL
EOSINOPHIL NFR BLD AUTO: 2.2 %
ESTIMATED AVERAGE GLUCOSE: 140 MG/DL
GLUCOSE SERPL-MCNC: 147 MG/DL
HBA1C MFR BLD HPLC: 6.5 %
HCT VFR BLD CALC: 42.6 %
HDLC SERPL-MCNC: 69 MG/DL
HGB BLD-MCNC: 14.1 G/DL
IMM GRANULOCYTES NFR BLD AUTO: 0.2 %
LDLC SERPL CALC-MCNC: 74 MG/DL
LYMPHOCYTES # BLD AUTO: 2.4 K/UL
LYMPHOCYTES NFR BLD AUTO: 27.5 %
MAN DIFF?: NORMAL
MCHC RBC-ENTMCNC: 29 PG
MCHC RBC-ENTMCNC: 33.1 G/DL
MCV RBC AUTO: 87.7 FL
MONOCYTES # BLD AUTO: 0.42 K/UL
MONOCYTES NFR BLD AUTO: 4.8 %
NEUTROPHILS # BLD AUTO: 5.65 K/UL
NEUTROPHILS NFR BLD AUTO: 64.6 %
NONHDLC SERPL-MCNC: 96 MG/DL
PLATELET # BLD AUTO: 249 K/UL
POTASSIUM SERPL-SCNC: 4.6 MMOL/L
PROT SERPL-MCNC: 7.4 G/DL
RBC # BLD: 4.86 M/UL
RBC # FLD: 12.1 %
SODIUM SERPL-SCNC: 142 MMOL/L
T4 FREE SERPL-MCNC: 1.6 NG/DL
TRIGL SERPL-MCNC: 110 MG/DL
TSH SERPL-ACNC: 0.43 UIU/ML
WBC # FLD AUTO: 8.74 K/UL

## 2022-10-18 ENCOUNTER — APPOINTMENT (OUTPATIENT)
Dept: CARDIOLOGY | Facility: CLINIC | Age: 72
End: 2022-10-18

## 2022-10-18 VITALS
DIASTOLIC BLOOD PRESSURE: 80 MMHG | HEIGHT: 60 IN | BODY MASS INDEX: 33.96 KG/M2 | HEART RATE: 75 BPM | TEMPERATURE: 97 F | WEIGHT: 173 LBS | SYSTOLIC BLOOD PRESSURE: 130 MMHG

## 2022-10-18 DIAGNOSIS — K21.9 GASTRO-ESOPHAGEAL REFLUX DISEASE W/OUT ESOPHAGITIS: ICD-10-CM

## 2022-10-18 PROCEDURE — 99214 OFFICE O/P EST MOD 30 MIN: CPT

## 2022-10-18 PROCEDURE — 93000 ELECTROCARDIOGRAM COMPLETE: CPT

## 2022-10-18 NOTE — PHYSICAL EXAM
"Kidney, Pancreas Transplant Evaluation - 1/6/2021  Vincent Leblanc watched the MTP videos at home due to the pandemic. We reviewed the content by telephone 1/25/2021     Content reviewed:    Living Donation and how to access that program    Paired exchange    Kidney Donor Profile Index (KDPI)    Waiting list issues (right to decline without penalty, high PHS risk donors, what to expect when called with an offer)    Hospital experience,  length of stay , need to stay locally post-discharge (2-4 weeks)    Surgical options (with pictures)                             Post-surgery lifting and driving restrictions    Post-transplant routines, frequency of lab work and clinic visits    Need to stay locally post-discharge (2-4 weeks)    Role of Transplant Coordinator    Participants were informed of the benefits of transplant as well as potential risks such as infection, cancer, and death.  The need for total adherence with immunosuppression medications and following transplant regimens was stressed.  The overall evaluation/approval/listing process was reviewed.        The patient was provided with the following documents:  What You Need to Know About a Kidney Transplant  Adult Kidney Transplant - A Guide for Patients  SRTR Data Sheet - Kidney  Brochure - Kidney Allocation  What You Need to Know About a Pancreas Transplant  Adult Pancreas Transplant-A Guide for Patients  Brochure - Pancreas  SRTR Data Sheet - Kidney/Pancreas  Brochure - SPK  Brochure - Multiple Listing and Waiting Time Transfer  What Every Patient Needs to Know (UNOS)  UNOS Facts and Figures  Finding a Donor  My Transplant Place - Quick Start Guide  KDPI Consent  Receipt of Information form    Vincent Leblanc was mailed  the  Receipt of Information for Organ Transplant Recipient.\" He was provided Beth Wan's business card and instructed to call with additional questions.          " [General Appearance - Well Developed] : well developed [Normal Appearance] : normal appearance [Well Groomed] : well groomed [General Appearance - Well Nourished] : well nourished [No Deformities] : no deformities [General Appearance - In No Acute Distress] : no acute distress [Normal Conjunctiva] : the conjunctiva exhibited no abnormalities [Eyelids - No Xanthelasma] : the eyelids demonstrated no xanthelasmas [Normal Oral Mucosa] : normal oral mucosa [No Oral Pallor] : no oral pallor [No Oral Cyanosis] : no oral cyanosis [Respiration, Rhythm And Depth] : normal respiratory rhythm and effort [Exaggerated Use Of Accessory Muscles For Inspiration] : no accessory muscle use [Auscultation Breath Sounds / Voice Sounds] : lungs were clear to auscultation bilaterally [Abdomen Soft] : soft [Abdomen Tenderness] : non-tender [Abdomen Mass (___ Cm)] : no abdominal mass palpated [Abnormal Walk] : normal gait [Gait - Sufficient For Exercise Testing] : the gait was sufficient for exercise testing [Nail Clubbing] : no clubbing of the fingernails [Cyanosis, Localized] : no localized cyanosis [Petechial Hemorrhages (___cm)] : no petechial hemorrhages [Skin Color & Pigmentation] : normal skin color and pigmentation [No Venous Stasis] : no venous stasis [] : no rash [Skin Lesions] : no skin lesions [No Skin Ulcers] : no skin ulcer [No Xanthoma] : no  xanthoma was observed [Affect] : the affect was normal [Oriented To Time, Place, And Person] : oriented to person, place, and time [Mood] : the mood was normal [No Anxiety] : not feeling anxious [Normal Rate] : normal [Rhythm Regular] : regular [II] : a grade 2 [2+] : left 2+ [No Pitting Edema] : no pitting edema present [FreeTextEntry1] : No JVD  [Rt] : no varicose veins of the right leg

## 2022-10-18 NOTE — CARDIOLOGY SUMMARY
[___] : [unfilled] [de-identified] : 6-7-2021 NSR poor R wav progression V1-V3 \par 11-2-2021 NSR Normal ECG  .\par 5- NSR Normal ECG . \par 10- NSR normal ECG .  [de-identified] : 5- Normal LV systolic functio mild MR mild TR Mild AS  [de-identified] : 7-6-2022 mild bilateral ICA stenosis

## 2022-10-18 NOTE — ASSESSMENT
[FreeTextEntry1] : . The patient has significant risk factors for CAD . Previous ischemia work up was negative for ischemia last nuclear stress test  . The patient has had no chest pain . She has known mild AS and has no symptoms related to that . LDL is acceptable . A1C is 6.5 . BP has been stable . She did not get repeat echo done  .She has mild carotid disease  .

## 2022-10-18 NOTE — HISTORY OF PRESENT ILLNESS
[FreeTextEntry1] : The patient has had no chest pain or SOB . Carotid doppler was negative for significant carotid disease . A1C is 6.5 She does have MCI .

## 2022-12-15 ENCOUNTER — APPOINTMENT (OUTPATIENT)
Dept: CARDIOLOGY | Facility: CLINIC | Age: 72
End: 2022-12-15

## 2022-12-15 VITALS
SYSTOLIC BLOOD PRESSURE: 156 MMHG | HEIGHT: 60 IN | TEMPERATURE: 97.4 F | HEART RATE: 70 BPM | BODY MASS INDEX: 33.38 KG/M2 | WEIGHT: 170 LBS | DIASTOLIC BLOOD PRESSURE: 70 MMHG

## 2022-12-15 VITALS — SYSTOLIC BLOOD PRESSURE: 140 MMHG | DIASTOLIC BLOOD PRESSURE: 80 MMHG

## 2022-12-15 PROCEDURE — 99214 OFFICE O/P EST MOD 30 MIN: CPT

## 2022-12-15 PROCEDURE — 93000 ELECTROCARDIOGRAM COMPLETE: CPT

## 2022-12-15 RX ORDER — CYCLOSPORINE 0.5 MG/ML
0.05 EMULSION OPHTHALMIC
Qty: 60 | Refills: 0 | Status: DISCONTINUED | COMMUNITY
Start: 2022-03-25 | End: 2022-12-15

## 2022-12-15 NOTE — CARDIOLOGY SUMMARY
[___] : [unfilled] [de-identified] : 6-7-2021 NSR poor R wav progression V1-V3 \par 11-2-2021 NSR Normal ECG  .\par 5- NSR Normal ECG . \par 10- NSR normal ECG .\par 12- NSR Normal ECG .   [de-identified] : 5- Normal LV systolic functio mild MR mild TR Mild AS  [de-identified] : 7-6-2022 mild bilateral ICA stenosis

## 2022-12-15 NOTE — ASSESSMENT
[FreeTextEntry1] : The patent had a prolonged episode of chest pain this past monday . She has risk factors for CAD . The patient has AS which is not significant on exam . ECG is normal .

## 2022-12-15 NOTE — HISTORY OF PRESENT ILLNESS
[FreeTextEntry1] : This week the patient had chest pain radiating to her left arm . She would not go to the ER . The patient has MCI and does not remenber having this . She has had nne since then and she has had no further chest pain

## 2022-12-21 ENCOUNTER — APPOINTMENT (OUTPATIENT)
Dept: CARDIOLOGY | Facility: CLINIC | Age: 72
End: 2022-12-21

## 2022-12-30 ENCOUNTER — OUTPATIENT (OUTPATIENT)
Dept: OUTPATIENT SERVICES | Facility: HOSPITAL | Age: 72
LOS: 1 days | Discharge: HOME | End: 2022-12-30
Payer: MEDICARE

## 2022-12-30 ENCOUNTER — RESULT REVIEW (OUTPATIENT)
Age: 72
End: 2022-12-30

## 2022-12-30 DIAGNOSIS — R07.9 CHEST PAIN, UNSPECIFIED: ICD-10-CM

## 2022-12-30 PROCEDURE — 78452 HT MUSCLE IMAGE SPECT MULT: CPT | Mod: 26,MH

## 2023-01-04 ENCOUNTER — APPOINTMENT (OUTPATIENT)
Dept: CARDIOLOGY | Facility: CLINIC | Age: 73
End: 2023-01-04
Payer: MEDICARE

## 2023-01-04 DIAGNOSIS — R07.9 CHEST PAIN, UNSPECIFIED: ICD-10-CM

## 2023-01-04 PROCEDURE — 93306 TTE W/DOPPLER COMPLETE: CPT

## 2023-01-09 PROBLEM — R07.9 CHEST PAIN AT REST: Status: ACTIVE | Noted: 2022-12-15

## 2023-03-08 ENCOUNTER — APPOINTMENT (OUTPATIENT)
Dept: NEUROLOGY | Facility: CLINIC | Age: 73
End: 2023-03-08

## 2023-03-31 ENCOUNTER — APPOINTMENT (OUTPATIENT)
Dept: CARDIOLOGY | Facility: CLINIC | Age: 73
End: 2023-03-31
Payer: MEDICARE

## 2023-03-31 VITALS — SYSTOLIC BLOOD PRESSURE: 134 MMHG | DIASTOLIC BLOOD PRESSURE: 84 MMHG | HEART RATE: 86 BPM

## 2023-03-31 VITALS — HEIGHT: 60 IN | WEIGHT: 167 LBS | BODY MASS INDEX: 32.79 KG/M2 | TEMPERATURE: 97.6 F

## 2023-03-31 DIAGNOSIS — I65.23 OCCLUSION AND STENOSIS OF BILATERAL CAROTID ARTERIES: ICD-10-CM

## 2023-03-31 DIAGNOSIS — F41.9 ANXIETY DISORDER, UNSPECIFIED: ICD-10-CM

## 2023-03-31 DIAGNOSIS — I87.2 VENOUS INSUFFICIENCY (CHRONIC) (PERIPHERAL): ICD-10-CM

## 2023-03-31 PROCEDURE — 99214 OFFICE O/P EST MOD 30 MIN: CPT

## 2023-03-31 PROCEDURE — 93000 ELECTROCARDIOGRAM COMPLETE: CPT

## 2023-03-31 RX ORDER — REPAGLINIDE 0.5 MG/1
0.5 TABLET ORAL TWICE DAILY
Refills: 0 | Status: DISCONTINUED | COMMUNITY
End: 2023-03-31

## 2023-03-31 NOTE — ASSESSMENT
[FreeTextEntry1] : The patient has been feeling well. No chest pain or SOB . Nuclear stress test showed no ischemia and echo showed nrmal LV systolic function  She has DM which is not controlled .

## 2023-03-31 NOTE — CARDIOLOGY SUMMARY
[de-identified] : 6-7-2021 NSR poor R wav progression V1-V3 \par 11-2-2021 NSR Normal ECG  .\par 5- NSR Normal ECG . \par 10- NSR normal ECG .3-  NSR Normal ECG \par 03/31/2023 SR, normal ECG\par 12- NSR Normal ECG .   [de-identified] : NM stress test\par 12/30/2022- no evidence of ischemia, EF >65% [de-identified] : 01/04/2023: EF 62%, moderate mitral annular calcification, mild AS NAY 2.1 cm2\par 5- Normal LV systolic functio mild MR mild TR Mild AS  [de-identified] : 7-6-2022 mild bilateral ICA stenosis  [___] : [unfilled]

## 2023-03-31 NOTE — HISTORY OF PRESENT ILLNESS
[FreeTextEntry1] : 72 y.o. female with PMH of HTN, hyperlipidemia, carotid stenosis, anxiety, mild AS presents for cardiology F/U visit. Patient ahs been complaining of chest pain at the last visit, nuclear stress test showed no evidence of ischemia and normal LV. \par Mild AS on TTE NAY 2.1cm2\par Patient denies chest pain, SOB, leg swelling. \par \par

## 2023-03-31 NOTE — PHYSICAL EXAM
[General Appearance - Well Developed] : well developed [Normal Appearance] : normal appearance [Well Groomed] : well groomed [General Appearance - Well Nourished] : well nourished [No Deformities] : no deformities [General Appearance - In No Acute Distress] : no acute distress [Normal Conjunctiva] : the conjunctiva exhibited no abnormalities [Eyelids - No Xanthelasma] : the eyelids demonstrated no xanthelasmas [Normal Oral Mucosa] : normal oral mucosa [No Oral Pallor] : no oral pallor [No Oral Cyanosis] : no oral cyanosis [FreeTextEntry1] : No JVD  [Respiration, Rhythm And Depth] : normal respiratory rhythm and effort [Exaggerated Use Of Accessory Muscles For Inspiration] : no accessory muscle use [Auscultation Breath Sounds / Voice Sounds] : lungs were clear to auscultation bilaterally [Abdomen Soft] : soft [Abdomen Tenderness] : non-tender [Abdomen Mass (___ Cm)] : no abdominal mass palpated [Abnormal Walk] : normal gait [Gait - Sufficient For Exercise Testing] : the gait was sufficient for exercise testing [Nail Clubbing] : no clubbing of the fingernails [Cyanosis, Localized] : no localized cyanosis [Petechial Hemorrhages (___cm)] : no petechial hemorrhages [Skin Color & Pigmentation] : normal skin color and pigmentation [] : no rash [No Venous Stasis] : no venous stasis [Skin Lesions] : no skin lesions [No Skin Ulcers] : no skin ulcer [No Xanthoma] : no  xanthoma was observed [Oriented To Time, Place, And Person] : oriented to person, place, and time [Affect] : the affect was normal [Mood] : the mood was normal [No Anxiety] : not feeling anxious [Normal Rate] : normal [Rhythm Regular] : regular [II] : a grade 2 [2+] : left 2+ [No Pitting Edema] : no pitting edema present [Rt] : no varicose veins of the right leg

## 2023-03-31 NOTE — CARDIOLOGY SUMMARY
[de-identified] : 6-7-2021 NSR poor R wav progression V1-V3 \par 11-2-2021 NSR Normal ECG  .\par 5- NSR Normal ECG . \par 10- NSR normal ECG .3-  NSR Normal ECG \par 03/31/2023 SR, normal ECG\par 12- NSR Normal ECG .   [de-identified] : NM stress test\par 12/30/2022- no evidence of ischemia, EF >65% [de-identified] : 01/04/2023: EF 62%, moderate mitral annular calcification, mild AS NAY 2.1 cm2\par 5- Normal LV systolic functio mild MR mild TR Mild AS  [de-identified] : 7-6-2022 mild bilateral ICA stenosis  [___] : [unfilled]

## 2023-03-31 NOTE — REASON FOR VISIT
[CV Risk Factors and Non-Cardiac Disease] : CV risk factors and non-cardiac disease [Hyperlipidemia] : hyperlipidemia [Carotid, Aortic and Peripheral Vascular Disease] : carotid, aortic and peripheral vascular disease [FreeTextEntry3] : Martinez/Riky  [Follow-Up - Clinic] : a clinic follow-up of [Hypertension] : hypertension [Palpitations] : palpitations

## 2023-08-07 ENCOUNTER — APPOINTMENT (OUTPATIENT)
Dept: CARDIOLOGY | Facility: CLINIC | Age: 73
End: 2023-08-07
Payer: MEDICARE

## 2023-08-07 VITALS — DIASTOLIC BLOOD PRESSURE: 80 MMHG | SYSTOLIC BLOOD PRESSURE: 138 MMHG

## 2023-08-07 VITALS
WEIGHT: 157 LBS | HEART RATE: 86 BPM | BODY MASS INDEX: 30.82 KG/M2 | HEIGHT: 60 IN | SYSTOLIC BLOOD PRESSURE: 152 MMHG | DIASTOLIC BLOOD PRESSURE: 84 MMHG

## 2023-08-07 DIAGNOSIS — G31.84 MILD COGNITIVE IMPAIRMENT, SO STATED: ICD-10-CM

## 2023-08-07 DIAGNOSIS — G47.33 OBSTRUCTIVE SLEEP APNEA (ADULT) (PEDIATRIC): ICD-10-CM

## 2023-08-07 PROCEDURE — 93000 ELECTROCARDIOGRAM COMPLETE: CPT

## 2023-08-07 PROCEDURE — 99214 OFFICE O/P EST MOD 30 MIN: CPT

## 2023-08-07 NOTE — CARDIOLOGY SUMMARY
[___] : [unfilled] [de-identified] : 6-7-2021 NSR poor R wav progression V1-V3  11-2-2021 NSR Normal ECG  . 5- NSR Normal ECG .  8-7-2023 NSR normal ECG  10- NSR normal ECG .3-  NSR Normal ECG  03/31/2023 SR, normal ECG 12- NSR Normal ECG .   [de-identified] : NM stress test12/30/2022- no evidence of ischemia, EF >65% [de-identified] : 01/04/2023: EF 62%, moderate mitral annular calcification, mild AS NAY 2.1 cm2\par  5- Normal LV systolic functio mild MR mild TR Mild AS  [de-identified] : 7-6-2022 mild bilateral ICA stenosis

## 2023-08-07 NOTE — ASSESSMENT
[FreeTextEntry1] : The patient has had issues of dementia and MCI . The patient has not had chest pain or SOB . The patient  has mild AS and risks fo CAD . No further chest pain and nuclear stress test was negative for ischemia recently .,

## 2023-08-07 NOTE — HISTORY OF PRESENT ILLNESS
[FreeTextEntry1] : The patient has had continued issues with demntia . She has not had chest pain .  No SOB . Nuclear stress test was negat.keren for ischemia recently . LDL has been at goal .

## 2023-08-07 NOTE — DISCUSSION/SUMMARY
[___ Month(s)] : in [unfilled] month(s) [FreeTextEntry1] : Follow up with PCP for her dementia issues  Risk factor redcution  Folow up in 4-5 months  The patient has lost 13 pounds since  . She was advised to see PCP conerning her weight loss  Blood work is ok .

## 2023-12-01 ENCOUNTER — OUTPATIENT (OUTPATIENT)
Dept: OUTPATIENT SERVICES | Facility: HOSPITAL | Age: 73
LOS: 1 days | End: 2023-12-01
Payer: MEDICARE

## 2023-12-01 DIAGNOSIS — Z13.820 ENCOUNTER FOR SCREENING FOR OSTEOPOROSIS: ICD-10-CM

## 2023-12-01 DIAGNOSIS — Z12.31 ENCOUNTER FOR SCREENING MAMMOGRAM FOR MALIGNANT NEOPLASM OF BREAST: ICD-10-CM

## 2023-12-01 DIAGNOSIS — Z00.8 ENCOUNTER FOR OTHER GENERAL EXAMINATION: ICD-10-CM

## 2023-12-01 PROCEDURE — 77063 BREAST TOMOSYNTHESIS BI: CPT

## 2023-12-01 PROCEDURE — 77063 BREAST TOMOSYNTHESIS BI: CPT | Mod: 26

## 2023-12-01 PROCEDURE — 77080 DXA BONE DENSITY AXIAL: CPT

## 2023-12-01 PROCEDURE — 77067 SCR MAMMO BI INCL CAD: CPT | Mod: 26

## 2023-12-01 PROCEDURE — 77067 SCR MAMMO BI INCL CAD: CPT

## 2023-12-02 DIAGNOSIS — Z12.31 ENCOUNTER FOR SCREENING MAMMOGRAM FOR MALIGNANT NEOPLASM OF BREAST: ICD-10-CM

## 2023-12-02 DIAGNOSIS — Z13.820 ENCOUNTER FOR SCREENING FOR OSTEOPOROSIS: ICD-10-CM

## 2023-12-13 ENCOUNTER — OUTPATIENT (OUTPATIENT)
Dept: OUTPATIENT SERVICES | Facility: HOSPITAL | Age: 73
LOS: 1 days | End: 2023-12-13
Payer: MEDICARE

## 2023-12-13 DIAGNOSIS — R92.8 OTHER ABNORMAL AND INCONCLUSIVE FINDINGS ON DIAGNOSTIC IMAGING OF BREAST: ICD-10-CM

## 2023-12-13 PROCEDURE — G0279: CPT

## 2023-12-13 PROCEDURE — 76642 ULTRASOUND BREAST LIMITED: CPT | Mod: 26,RT

## 2023-12-13 PROCEDURE — 77065 DX MAMMO INCL CAD UNI: CPT | Mod: 26,RT

## 2023-12-13 PROCEDURE — 77065 DX MAMMO INCL CAD UNI: CPT | Mod: RT

## 2023-12-13 PROCEDURE — 76642 ULTRASOUND BREAST LIMITED: CPT | Mod: RT

## 2023-12-13 PROCEDURE — G0279: CPT | Mod: 26

## 2023-12-14 DIAGNOSIS — R92.8 OTHER ABNORMAL AND INCONCLUSIVE FINDINGS ON DIAGNOSTIC IMAGING OF BREAST: ICD-10-CM

## 2024-01-13 LAB
ALBUMIN SERPL ELPH-MCNC: 4.7 G/DL
ALP BLD-CCNC: 101 U/L
ALT SERPL-CCNC: 6 U/L
ANION GAP SERPL CALC-SCNC: 15 MMOL/L
AST SERPL-CCNC: 14 U/L
BILIRUB SERPL-MCNC: 0.4 MG/DL
BUN SERPL-MCNC: 13 MG/DL
CALCIUM SERPL-MCNC: 9.8 MG/DL
CHLORIDE SERPL-SCNC: 102 MMOL/L
CHOLEST SERPL-MCNC: 266 MG/DL
CO2 SERPL-SCNC: 25 MMOL/L
CREAT SERPL-MCNC: 0.9 MG/DL
EGFR: 67 ML/MIN/1.73M2
ESTIMATED AVERAGE GLUCOSE: 114 MG/DL
GLUCOSE SERPL-MCNC: 139 MG/DL
HBA1C MFR BLD HPLC: 5.6 %
HDLC SERPL-MCNC: 74 MG/DL
LDLC SERPL CALC-MCNC: 173 MG/DL
NONHDLC SERPL-MCNC: 192 MG/DL
POTASSIUM SERPL-SCNC: 5.1 MMOL/L
PROT SERPL-MCNC: 7.2 G/DL
SODIUM SERPL-SCNC: 142 MMOL/L
T4 FREE SERPL-MCNC: 1.8 NG/DL
TRIGL SERPL-MCNC: 97 MG/DL
TSH SERPL-ACNC: 0.91 UIU/ML

## 2024-01-17 ENCOUNTER — APPOINTMENT (OUTPATIENT)
Dept: CARDIOLOGY | Facility: CLINIC | Age: 74
End: 2024-01-17
Payer: MEDICARE

## 2024-01-17 VITALS
WEIGHT: 148 LBS | BODY MASS INDEX: 29.06 KG/M2 | DIASTOLIC BLOOD PRESSURE: 72 MMHG | HEIGHT: 60 IN | SYSTOLIC BLOOD PRESSURE: 152 MMHG | HEART RATE: 77 BPM

## 2024-01-17 DIAGNOSIS — F03.90 UNSPECIFIED DEMENTIA W/OUT BEHAVIORAL DISTURBANCE: ICD-10-CM

## 2024-01-17 PROCEDURE — 93000 ELECTROCARDIOGRAM COMPLETE: CPT

## 2024-01-17 PROCEDURE — 99214 OFFICE O/P EST MOD 30 MIN: CPT

## 2024-01-17 RX ORDER — ATORVASTATIN CALCIUM 80 MG/1
80 TABLET, FILM COATED ORAL
Qty: 90 | Refills: 3 | Status: ACTIVE | COMMUNITY
Start: 1900-01-01 | End: 1900-01-01

## 2024-01-17 RX ORDER — VALSARTAN 160 MG/1
160 TABLET, COATED ORAL DAILY
Qty: 90 | Refills: 3 | Status: ACTIVE | COMMUNITY
Start: 1900-01-01 | End: 1900-01-01

## 2024-01-17 NOTE — CARDIOLOGY SUMMARY
[___] : [unfilled] [de-identified] : 6-7-2021 NSR poor R wav progression V1-V3  11-2-2021 NSR Normal ECG  . 5- NSR Normal ECG .  1- NSR Normal ECG .  8-7-2023 NSR normal ECG  10- NSR normal ECG .3-  NSR Normal ECG  03/31/2023 SR, normal ECG 12- NSR Normal ECG .   [de-identified] : NM stress test12/30/2022- no evidence of ischemia, EF >65% [de-identified] : 01/04/2023: EF 62%, moderate mitral annular calcification, mild AS NAY 2.1 cm2\par  5- Normal LV systolic functio mild MR mild TR Mild AS  [de-identified] : 7-6-2022 mild bilateral ICA stenosis

## 2024-01-17 NOTE — ASSESSMENT
[FreeTextEntry1] : The patient has had no chest pain . She has AS which is thought to be mild . She has increased cholesterol DM and hypothyroidism . The patient has had progressively worsening dementia . She had seen Dr. Rapp in the past but treatment was not offered . The patient has had a recent nuclear stress test which was negative for ischemia .

## 2024-01-17 NOTE — HISTORY OF PRESENT ILLNESS
[FreeTextEntry1] : The patient has had worsening dementia. Shehas not followed up neurology . The patient has not had chest pain or SOB . There is a question if she is taking her medication  Her LDL is not at goal . .

## 2024-03-17 ENCOUNTER — RX RENEWAL (OUTPATIENT)
Age: 74
End: 2024-03-17

## 2024-03-17 RX ORDER — METFORMIN HYDROCHLORIDE 500 MG/1
500 TABLET, COATED ORAL
Qty: 180 | Refills: 3 | Status: ACTIVE | COMMUNITY
Start: 2023-03-31 | End: 1900-01-01

## 2024-04-25 ENCOUNTER — APPOINTMENT (OUTPATIENT)
Dept: CARDIOLOGY | Facility: CLINIC | Age: 74
End: 2024-04-25
Payer: MEDICARE

## 2024-04-25 VITALS — SYSTOLIC BLOOD PRESSURE: 160 MMHG | HEART RATE: 75 BPM | DIASTOLIC BLOOD PRESSURE: 80 MMHG

## 2024-04-25 VITALS — HEIGHT: 60 IN | BODY MASS INDEX: 27.88 KG/M2 | TEMPERATURE: 97.6 F | WEIGHT: 142 LBS

## 2024-04-25 DIAGNOSIS — G47.33 OBSTRUCTIVE SLEEP APNEA (ADULT) (PEDIATRIC): ICD-10-CM

## 2024-04-25 DIAGNOSIS — E11.9 TYPE 2 DIABETES MELLITUS W/OUT COMPLICATIONS: ICD-10-CM

## 2024-04-25 DIAGNOSIS — E03.9 HYPOTHYROIDISM, UNSPECIFIED: ICD-10-CM

## 2024-04-25 DIAGNOSIS — E78.5 HYPERLIPIDEMIA, UNSPECIFIED: ICD-10-CM

## 2024-04-25 DIAGNOSIS — E66.9 OBESITY, UNSPECIFIED: ICD-10-CM

## 2024-04-25 PROCEDURE — 99214 OFFICE O/P EST MOD 30 MIN: CPT

## 2024-04-25 PROCEDURE — 93000 ELECTROCARDIOGRAM COMPLETE: CPT

## 2024-04-25 NOTE — ASSESSMENT
[FreeTextEntry1] : The patient has had no chest pain . She has AS which is thought to be mild . She has increased cholesterol DM and hypothyroidism . The patient has had progressively worsening dementia . She had seen Dr. Rapp in the past but treatment was not offered . The patient has had a recent nuclear stress test which was negative for ischemia .  The patient has not had her echo as of yet

## 2024-04-25 NOTE — CARDIOLOGY SUMMARY
[___] : [unfilled] [de-identified] : 6-7-2021 NSR poor R wav progression V1-V3  11-2-2021 NSR Normal ECG  . 5- NSR Normal ECG .  4- NSR normal ECG  1- NSR Normal ECG .  8-7-2023 NSR normal ECG  10- NSR normal ECG .3-  NSR Normal ECG  03/31/2023 SR, normal ECG 12- NSR Normal ECG .   [de-identified] : NM stress test12/30/2022- no evidence of ischemia, EF >65% [de-identified] : 01/04/2023: EF 62%, moderate mitral annular calcification, mild AS NAY 2.1 cm2\par  5- Normal LV systolic functio mild MR mild TR Mild AS  [de-identified] : 7-6-2022 mild bilateral ICA stenosis

## 2024-04-25 NOTE — HISTORY OF PRESENT ILLNESS
[FreeTextEntry1] : The patient has had worsening dementia. She has not followed up neurology . The patient has not had chest pain or SOB .  Her dementia is  progressing

## 2024-05-11 ENCOUNTER — APPOINTMENT (OUTPATIENT)
Dept: CARDIOLOGY | Facility: CLINIC | Age: 74
End: 2024-05-11
Payer: MEDICARE

## 2024-05-11 DIAGNOSIS — I10 ESSENTIAL (PRIMARY) HYPERTENSION: ICD-10-CM

## 2024-05-11 DIAGNOSIS — R00.2 PALPITATIONS: ICD-10-CM

## 2024-05-11 DIAGNOSIS — I49.3 VENTRICULAR PREMATURE DEPOLARIZATION: ICD-10-CM

## 2024-05-11 DIAGNOSIS — I35.0 NONRHEUMATIC AORTIC (VALVE) STENOSIS: ICD-10-CM

## 2024-05-11 PROCEDURE — 93306 TTE W/DOPPLER COMPLETE: CPT

## 2024-07-10 ENCOUNTER — APPOINTMENT (OUTPATIENT)
Dept: CARDIOLOGY | Facility: CLINIC | Age: 74
End: 2024-07-10
Payer: MEDICARE

## 2024-07-10 VITALS — HEIGHT: 60 IN | TEMPERATURE: 97.5 F

## 2024-07-10 VITALS — BODY MASS INDEX: 26.17 KG/M2 | WEIGHT: 134 LBS

## 2024-07-10 VITALS — HEART RATE: 74 BPM | SYSTOLIC BLOOD PRESSURE: 140 MMHG | DIASTOLIC BLOOD PRESSURE: 80 MMHG

## 2024-07-10 DIAGNOSIS — I35.0 NONRHEUMATIC AORTIC (VALVE) STENOSIS: ICD-10-CM

## 2024-07-10 DIAGNOSIS — F03.90 UNSPECIFIED DEMENTIA W/OUT BEHAVIORAL DISTURBANCE: ICD-10-CM

## 2024-07-10 DIAGNOSIS — I10 ESSENTIAL (PRIMARY) HYPERTENSION: ICD-10-CM

## 2024-07-10 DIAGNOSIS — K21.9 GASTRO-ESOPHAGEAL REFLUX DISEASE W/OUT ESOPHAGITIS: ICD-10-CM

## 2024-07-10 DIAGNOSIS — E78.5 HYPERLIPIDEMIA, UNSPECIFIED: ICD-10-CM

## 2024-07-10 PROCEDURE — 93000 ELECTROCARDIOGRAM COMPLETE: CPT

## 2024-07-10 PROCEDURE — 99214 OFFICE O/P EST MOD 30 MIN: CPT

## 2024-10-17 ENCOUNTER — APPOINTMENT (OUTPATIENT)
Dept: CARDIOLOGY | Facility: CLINIC | Age: 74
End: 2024-10-17
Payer: MEDICARE

## 2024-10-17 VITALS — HEART RATE: 77 BPM | SYSTOLIC BLOOD PRESSURE: 158 MMHG | DIASTOLIC BLOOD PRESSURE: 82 MMHG

## 2024-10-17 DIAGNOSIS — E78.5 HYPERLIPIDEMIA, UNSPECIFIED: ICD-10-CM

## 2024-10-17 DIAGNOSIS — G47.33 OBSTRUCTIVE SLEEP APNEA (ADULT) (PEDIATRIC): ICD-10-CM

## 2024-10-17 DIAGNOSIS — I35.0 NONRHEUMATIC AORTIC (VALVE) STENOSIS: ICD-10-CM

## 2024-10-17 DIAGNOSIS — I10 ESSENTIAL (PRIMARY) HYPERTENSION: ICD-10-CM

## 2024-10-17 DIAGNOSIS — E03.9 HYPOTHYROIDISM, UNSPECIFIED: ICD-10-CM

## 2024-10-17 DIAGNOSIS — E11.9 TYPE 2 DIABETES MELLITUS W/OUT COMPLICATIONS: ICD-10-CM

## 2024-10-17 DIAGNOSIS — E66.9 OBESITY, UNSPECIFIED: ICD-10-CM

## 2024-10-17 PROCEDURE — 99214 OFFICE O/P EST MOD 30 MIN: CPT

## 2024-10-17 PROCEDURE — 93000 ELECTROCARDIOGRAM COMPLETE: CPT

## 2024-11-06 ENCOUNTER — APPOINTMENT (OUTPATIENT)
Dept: CARDIOLOGY | Facility: CLINIC | Age: 74
End: 2024-11-06

## 2024-12-13 ENCOUNTER — OUTPATIENT (OUTPATIENT)
Dept: OUTPATIENT SERVICES | Facility: HOSPITAL | Age: 74
LOS: 1 days | End: 2024-12-13
Payer: MEDICARE

## 2024-12-13 DIAGNOSIS — Z12.31 ENCOUNTER FOR SCREENING MAMMOGRAM FOR MALIGNANT NEOPLASM OF BREAST: ICD-10-CM

## 2024-12-13 PROCEDURE — 77063 BREAST TOMOSYNTHESIS BI: CPT | Mod: 26

## 2024-12-13 PROCEDURE — 77067 SCR MAMMO BI INCL CAD: CPT

## 2024-12-13 PROCEDURE — 77063 BREAST TOMOSYNTHESIS BI: CPT

## 2024-12-13 PROCEDURE — 77067 SCR MAMMO BI INCL CAD: CPT | Mod: 26

## 2024-12-14 DIAGNOSIS — Z12.31 ENCOUNTER FOR SCREENING MAMMOGRAM FOR MALIGNANT NEOPLASM OF BREAST: ICD-10-CM

## 2024-12-20 ENCOUNTER — APPOINTMENT (OUTPATIENT)
Dept: NEUROLOGY | Facility: CLINIC | Age: 74
End: 2024-12-20

## 2025-02-19 ENCOUNTER — APPOINTMENT (OUTPATIENT)
Dept: CARDIOLOGY | Facility: CLINIC | Age: 75
End: 2025-02-19

## 2025-02-28 ENCOUNTER — APPOINTMENT (OUTPATIENT)
Dept: NEUROLOGY | Facility: CLINIC | Age: 75
End: 2025-02-28
Payer: MEDICARE

## 2025-02-28 VITALS — SYSTOLIC BLOOD PRESSURE: 149 MMHG | OXYGEN SATURATION: 98 % | DIASTOLIC BLOOD PRESSURE: 80 MMHG | HEART RATE: 67 BPM

## 2025-02-28 VITALS — BODY MASS INDEX: 26.42 KG/M2 | HEIGHT: 60 IN | WEIGHT: 134.56 LBS

## 2025-02-28 DIAGNOSIS — F03.90 UNSPECIFIED DEMENTIA W/OUT BEHAVIORAL DISTURBANCE: ICD-10-CM

## 2025-02-28 PROCEDURE — 99205 OFFICE O/P NEW HI 60 MIN: CPT

## 2025-02-28 RX ORDER — DONEPEZIL HYDROCHLORIDE 10 MG/1
10 TABLET ORAL DAILY
Refills: 0 | Status: ACTIVE | COMMUNITY

## 2025-03-03 LAB
ESTIMATED AVERAGE GLUCOSE: 111 MG/DL
FOLATE SERPL-MCNC: >20 NG/ML
HBA1C MFR BLD HPLC: 5.5 %
T4 SERPL-MCNC: 8.7 UG/DL
TSH SERPL-ACNC: 3.39 UIU/ML
VIT B12 SERPL-MCNC: 1547 PG/ML

## 2025-03-08 ENCOUNTER — RESULT REVIEW (OUTPATIENT)
Age: 75
End: 2025-03-08

## 2025-03-08 ENCOUNTER — OUTPATIENT (OUTPATIENT)
Dept: OUTPATIENT SERVICES | Facility: HOSPITAL | Age: 75
LOS: 1 days | End: 2025-03-08
Payer: MEDICARE

## 2025-03-08 DIAGNOSIS — Z00.8 ENCOUNTER FOR OTHER GENERAL EXAMINATION: ICD-10-CM

## 2025-03-08 PROCEDURE — 70551 MRI BRAIN STEM W/O DYE: CPT | Mod: 26

## 2025-03-08 PROCEDURE — 70551 MRI BRAIN STEM W/O DYE: CPT

## 2025-03-09 DIAGNOSIS — F03.90 UNSPECIFIED DEMENTIA, UNSPECIFIED SEVERITY, WITHOUT BEHAVIORAL DISTURBANCE, PSYCHOTIC DISTURBANCE, MOOD DISTURBANCE, AND ANXIETY: ICD-10-CM

## 2025-03-11 ENCOUNTER — TRANSCRIPTION ENCOUNTER (OUTPATIENT)
Age: 75
End: 2025-03-11

## 2025-03-21 ENCOUNTER — APPOINTMENT (OUTPATIENT)
Dept: CARDIOLOGY | Facility: CLINIC | Age: 75
End: 2025-03-21
Payer: MEDICARE

## 2025-03-21 VITALS — DIASTOLIC BLOOD PRESSURE: 70 MMHG | SYSTOLIC BLOOD PRESSURE: 136 MMHG

## 2025-03-21 VITALS — HEART RATE: 69 BPM

## 2025-03-21 VITALS — HEIGHT: 60 IN | WEIGHT: 137 LBS | TEMPERATURE: 97.6 F | BODY MASS INDEX: 26.9 KG/M2

## 2025-03-21 DIAGNOSIS — Z00.00 ENCOUNTER FOR GENERAL ADULT MEDICAL EXAMINATION W/OUT ABNORMAL FINDINGS: ICD-10-CM

## 2025-03-21 DIAGNOSIS — E78.5 HYPERLIPIDEMIA, UNSPECIFIED: ICD-10-CM

## 2025-03-21 DIAGNOSIS — K21.9 GASTRO-ESOPHAGEAL REFLUX DISEASE W/OUT ESOPHAGITIS: ICD-10-CM

## 2025-03-21 DIAGNOSIS — F03.90 UNSPECIFIED DEMENTIA W/OUT BEHAVIORAL DISTURBANCE: ICD-10-CM

## 2025-03-21 DIAGNOSIS — G47.33 OBSTRUCTIVE SLEEP APNEA (ADULT) (PEDIATRIC): ICD-10-CM

## 2025-03-21 DIAGNOSIS — E11.9 TYPE 2 DIABETES MELLITUS W/OUT COMPLICATIONS: ICD-10-CM

## 2025-03-21 DIAGNOSIS — I35.0 NONRHEUMATIC AORTIC (VALVE) STENOSIS: ICD-10-CM

## 2025-03-21 DIAGNOSIS — I65.23 OCCLUSION AND STENOSIS OF BILATERAL CAROTID ARTERIES: ICD-10-CM

## 2025-03-21 DIAGNOSIS — E03.9 HYPOTHYROIDISM, UNSPECIFIED: ICD-10-CM

## 2025-03-21 DIAGNOSIS — E66.9 OBESITY, UNSPECIFIED: ICD-10-CM

## 2025-03-21 PROCEDURE — 99214 OFFICE O/P EST MOD 30 MIN: CPT

## 2025-03-21 PROCEDURE — 93000 ELECTROCARDIOGRAM COMPLETE: CPT

## 2025-03-21 RX ORDER — LEVOTHYROXINE SODIUM 0.17 MG/1
TABLET ORAL
Refills: 0 | Status: ACTIVE | COMMUNITY

## 2025-03-29 ENCOUNTER — NON-APPOINTMENT (OUTPATIENT)
Age: 75
End: 2025-03-29

## 2025-03-31 ENCOUNTER — APPOINTMENT (OUTPATIENT)
Dept: NEUROLOGY | Facility: CLINIC | Age: 75
End: 2025-03-31
Payer: MEDICARE

## 2025-03-31 PROCEDURE — 95816 EEG AWAKE AND DROWSY: CPT

## 2025-05-08 ENCOUNTER — APPOINTMENT (OUTPATIENT)
Dept: NEUROPSYCHOLOGY | Facility: CLINIC | Age: 75
End: 2025-05-08

## 2025-05-08 ENCOUNTER — OUTPATIENT (OUTPATIENT)
Dept: OUTPATIENT SERVICES | Facility: HOSPITAL | Age: 75
LOS: 1 days | End: 2025-05-08
Payer: MEDICARE

## 2025-05-08 DIAGNOSIS — G31.84 MILD COGNITIVE IMPAIRMENT OF UNCERTAIN OR UNKNOWN ETIOLOGY: ICD-10-CM

## 2025-05-08 DIAGNOSIS — F03.90 UNSPECIFIED DEMENTIA, UNSPECIFIED SEVERITY, WITHOUT BEHAVIORAL DISTURBANCE, PSYCHOTIC DISTURBANCE, MOOD DISTURBANCE, AND ANXIETY: ICD-10-CM

## 2025-05-08 PROCEDURE — 96116 NUBHVL XM PHYS/QHP 1ST HR: CPT

## 2025-05-08 PROCEDURE — 96121 NUBHVL XM PHY/QHP EA ADDL HR: CPT

## 2025-05-09 DIAGNOSIS — G31.84 MILD COGNITIVE IMPAIRMENT OF UNCERTAIN OR UNKNOWN ETIOLOGY: ICD-10-CM

## 2025-05-22 ENCOUNTER — APPOINTMENT (OUTPATIENT)
Dept: NEUROPSYCHOLOGY | Facility: CLINIC | Age: 75
End: 2025-05-22

## 2025-05-22 ENCOUNTER — OUTPATIENT (OUTPATIENT)
Dept: OUTPATIENT SERVICES | Facility: HOSPITAL | Age: 75
LOS: 1 days | End: 2025-05-22

## 2025-05-22 DIAGNOSIS — G31.84 MILD COGNITIVE IMPAIRMENT OF UNCERTAIN OR UNKNOWN ETIOLOGY: ICD-10-CM

## 2025-06-24 ENCOUNTER — NON-APPOINTMENT (OUTPATIENT)
Age: 75
End: 2025-06-24

## 2025-06-25 ENCOUNTER — APPOINTMENT (OUTPATIENT)
Dept: NEUROLOGY | Facility: CLINIC | Age: 75
End: 2025-06-25

## 2025-06-25 VITALS
BODY MASS INDEX: 29.84 KG/M2 | WEIGHT: 152 LBS | DIASTOLIC BLOOD PRESSURE: 85 MMHG | SYSTOLIC BLOOD PRESSURE: 160 MMHG | HEIGHT: 60 IN | HEART RATE: 69 BPM

## 2025-06-25 PROCEDURE — 99214 OFFICE O/P EST MOD 30 MIN: CPT

## 2025-06-25 PROCEDURE — G2211 COMPLEX E/M VISIT ADD ON: CPT

## 2025-06-26 ENCOUNTER — APPOINTMENT (OUTPATIENT)
Dept: NEUROPSYCHOLOGY | Facility: CLINIC | Age: 75
End: 2025-06-26

## 2025-07-11 ENCOUNTER — NON-APPOINTMENT (OUTPATIENT)
Age: 75
End: 2025-07-11

## 2025-08-05 ENCOUNTER — APPOINTMENT (OUTPATIENT)
Dept: NEUROPSYCHOLOGY | Facility: CLINIC | Age: 75
End: 2025-08-05

## 2025-08-12 ENCOUNTER — APPOINTMENT (OUTPATIENT)
Dept: NEUROPSYCHOLOGY | Facility: CLINIC | Age: 75
End: 2025-08-12

## 2025-08-19 ENCOUNTER — APPOINTMENT (OUTPATIENT)
Dept: NEUROPSYCHOLOGY | Facility: CLINIC | Age: 75
End: 2025-08-19

## 2025-08-22 ENCOUNTER — APPOINTMENT (OUTPATIENT)
Dept: CARDIOLOGY | Facility: CLINIC | Age: 75
End: 2025-08-22
Payer: MEDICARE

## 2025-08-22 VITALS — SYSTOLIC BLOOD PRESSURE: 150 MMHG | DIASTOLIC BLOOD PRESSURE: 80 MMHG

## 2025-08-22 VITALS — SYSTOLIC BLOOD PRESSURE: 156 MMHG | HEART RATE: 69 BPM | DIASTOLIC BLOOD PRESSURE: 80 MMHG

## 2025-08-22 VITALS — BODY MASS INDEX: 30.63 KG/M2 | WEIGHT: 156 LBS | HEIGHT: 60 IN

## 2025-08-22 DIAGNOSIS — I10 ESSENTIAL (PRIMARY) HYPERTENSION: ICD-10-CM

## 2025-08-22 DIAGNOSIS — K21.9 GASTRO-ESOPHAGEAL REFLUX DISEASE W/OUT ESOPHAGITIS: ICD-10-CM

## 2025-08-22 DIAGNOSIS — E78.5 HYPERLIPIDEMIA, UNSPECIFIED: ICD-10-CM

## 2025-08-22 DIAGNOSIS — E03.9 HYPOTHYROIDISM, UNSPECIFIED: ICD-10-CM

## 2025-08-22 DIAGNOSIS — E11.9 TYPE 2 DIABETES MELLITUS W/OUT COMPLICATIONS: ICD-10-CM

## 2025-08-22 DIAGNOSIS — I65.23 OCCLUSION AND STENOSIS OF BILATERAL CAROTID ARTERIES: ICD-10-CM

## 2025-08-22 PROCEDURE — 99214 OFFICE O/P EST MOD 30 MIN: CPT

## 2025-08-22 PROCEDURE — 93000 ELECTROCARDIOGRAM COMPLETE: CPT

## 2025-08-22 RX ORDER — LEVOTHYROXINE SODIUM 0.09 MG/1
88 TABLET ORAL
Refills: 0 | Status: ACTIVE | COMMUNITY

## 2025-08-26 ENCOUNTER — APPOINTMENT (OUTPATIENT)
Dept: NEUROPSYCHOLOGY | Facility: CLINIC | Age: 75
End: 2025-08-26

## 2025-09-02 ENCOUNTER — APPOINTMENT (OUTPATIENT)
Dept: NEUROPSYCHOLOGY | Facility: CLINIC | Age: 75
End: 2025-09-02

## 2025-09-09 ENCOUNTER — APPOINTMENT (OUTPATIENT)
Dept: NEUROPSYCHOLOGY | Facility: CLINIC | Age: 75
End: 2025-09-09

## 2025-09-16 ENCOUNTER — APPOINTMENT (OUTPATIENT)
Dept: NEUROPSYCHOLOGY | Facility: CLINIC | Age: 75
End: 2025-09-16

## 2025-09-17 ENCOUNTER — APPOINTMENT (OUTPATIENT)
Dept: NEUROLOGY | Facility: CLINIC | Age: 75
End: 2025-09-17
Payer: MEDICARE

## 2025-09-17 VITALS
SYSTOLIC BLOOD PRESSURE: 152 MMHG | HEIGHT: 60 IN | WEIGHT: 161 LBS | DIASTOLIC BLOOD PRESSURE: 84 MMHG | HEART RATE: 73 BPM | BODY MASS INDEX: 31.61 KG/M2

## 2025-09-17 DIAGNOSIS — F03.90 UNSPECIFIED DEMENTIA W/OUT BEHAVIORAL DISTURBANCE: ICD-10-CM

## 2025-09-17 PROCEDURE — G2211 COMPLEX E/M VISIT ADD ON: CPT

## 2025-09-17 PROCEDURE — 99214 OFFICE O/P EST MOD 30 MIN: CPT

## 2025-09-17 RX ORDER — MEMANTINE HYDROCHLORIDE 14 MG/1
14 CAPSULE, EXTENDED RELEASE ORAL
Qty: 90 | Refills: 1 | Status: ACTIVE | COMMUNITY
Start: 2025-09-17 | End: 1900-01-01

## 2025-09-17 RX ORDER — BREXPIPRAZOLE 1 MG/1
1 TABLET ORAL
Refills: 0 | Status: ACTIVE | COMMUNITY

## 2025-09-17 RX ORDER — SERTRALINE 25 MG/1
25 TABLET, FILM COATED ORAL
Refills: 0 | Status: ACTIVE | COMMUNITY